# Patient Record
Sex: FEMALE | Race: BLACK OR AFRICAN AMERICAN | NOT HISPANIC OR LATINO | Employment: FULL TIME | URBAN - METROPOLITAN AREA
[De-identification: names, ages, dates, MRNs, and addresses within clinical notes are randomized per-mention and may not be internally consistent; named-entity substitution may affect disease eponyms.]

---

## 2017-02-13 ENCOUNTER — OFFICE VISIT (OUTPATIENT)
Dept: FAMILY MEDICINE | Facility: CLINIC | Age: 22
End: 2017-02-13

## 2017-02-13 VITALS
SYSTOLIC BLOOD PRESSURE: 102 MMHG | BODY MASS INDEX: 28.85 KG/M2 | OXYGEN SATURATION: 99 % | HEIGHT: 64 IN | DIASTOLIC BLOOD PRESSURE: 70 MMHG | HEART RATE: 66 BPM | WEIGHT: 169 LBS

## 2017-02-13 DIAGNOSIS — F90.2 ATTENTION DEFICIT HYPERACTIVITY DISORDER (ADHD), COMBINED TYPE: ICD-10-CM

## 2017-02-13 PROCEDURE — 99213 OFFICE O/P EST LOW 20 MIN: CPT | Performed by: FAMILY MEDICINE

## 2017-02-13 RX ORDER — CLONIDINE HYDROCHLORIDE 0.2 MG/1
0.2 TABLET ORAL AT BEDTIME
Qty: 30 TABLET | Refills: 1 | Status: ON HOLD | OUTPATIENT
Start: 2017-02-13 | End: 2019-04-13

## 2017-02-13 RX ORDER — LISDEXAMFETAMINE DIMESYLATE 20 MG/1
20 CAPSULE ORAL EVERY MORNING
Qty: 30 CAPSULE | Refills: 0 | Status: SHIPPED | OUTPATIENT
Start: 2017-02-13 | End: 2017-02-20

## 2017-02-13 NOTE — PROGRESS NOTES
"She has a history of ADHD, and is planning to go back to school, for CNA. She is hoping she can get prescription for Adderall or Vyvanse. She has a history of bipolar affective disorder, on no medications but doing well. She has taken clonidine at night to help her sleep when she was on stimulants before.  OBJECTIVE: /70  Pulse 66  Ht 1.632 m (5' 4.25\")  Wt 76.7 kg (169 lb)  SpO2 99%  BMI 28.78 kg/m2 NAD her mood and affect seem fine. 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  (F90.2) Attention deficit hyperactivity disorder (ADHD), combined type  Comment:   Plan: lisdexamfetamine (VYVANSE) 20 MG capsule,         cloNIDine (CATAPRES) 0.2 MG tablet        RTC 1 month for recheck.       "

## 2017-02-13 NOTE — MR AVS SNAPSHOT
"              After Visit Summary   2017    Milena Brumfield    MRN: [id number]           Patient Information     Date Of Birth          1995        Visit Information        Provider Department      2017 12:00 PM Lobo Smith MD [facility]        Today's Diagnoses     Attention deficit hyperactivity disorder (ADHD), combined type           Follow-ups after your visit        Follow-up notes from your care team     Return in about 4 weeks (around 3/13/2017).      Who to contact     If you have questions or need follow up information about today's clinic visit or your schedule please contact [facility] directly at [phone].  Normal or non-critical lab and imaging results will be communicated to you by MyChart, letter or phone within 4 business days after the clinic has received the results. If you do not hear from us within 7 days, please contact the clinic through Kamegoh[employer] or phone. If you have a critical or abnormal lab result, we will notify you by phone as soon as possible.  Submit refill requests through [employer] or call your pharmacy and they will forward the refill request to us. Please allow 3 business days for your refill to be completed.          Additional Information About Your Visit        MyChart Information     [employer] lets you send messages to your doctor, view your test results, renew your prescriptions, schedule appointments and more. To sign up, go to [email or web address] . Click on \"Log in\" on the left side of the screen, which will take you to the Welcome page. Then click on \"Sign up Now\" on the right side of the page.     You will be asked to enter the access code listed below, as well as some personal information. Please follow the directions to create your username and password.     Your access code is: [email or web address]  Expires: 2017 12:31 PM     Your access code will  in 90 days. If you need help or a new code, please call your [place] " "clinic or [phone].        Care EveryWhere ID     This is your Care EveryWhere ID. This could be used by other organizations to access your [place] medical records  [id number]        Your Vitals Were     Pulse Height Pulse Oximetry BMI (Body Mass Index)          66 1.632 m (5' 4.25\") 99% 28.78 kg/m2         Blood Pressure from Last 3 Encounters:   02/13/17 102/70   07/18/16 108/60   04/01/16 91/44    Weight from Last 3 Encounters:   02/13/17 76.7 kg (169 lb)   07/18/16 76.2 kg (168 lb)   04/01/16 72.6 kg (160 lb)              Today, you had the following     No orders found for display         Today's Medication Changes          These changes are accurate as of: 2/13/17 12:31 PM.  If you have any questions, ask your nurse or doctor.               Start taking these medicines.        Dose/Directions    cloNIDine 0.2 MG tablet   Commonly known as:  CATAPRES   Used for:  Attention deficit hyperactivity disorder (ADHD), combined type   Started by:  Lobo Smith MD        Dose:  0.2 mg   Take 1 tablet (0.2 mg) by mouth At Bedtime   Quantity:  30 tablet   Refills:  1       lisdexamfetamine 20 MG capsule   Commonly known as:  VYVANSE   Used for:  Attention deficit hyperactivity disorder (ADHD), combined type   Started by:  Lobo Smith MD        Dose:  20 mg   Take 1 capsule (20 mg) by mouth every morning   Quantity:  30 capsule   Refills:  0            Where to get your medicines      These medications were sent to [facility] [employer] [address] [facility] [address] AT [facility] [address] [facility] [address]     Phone:  [phone]     cloNIDine 0.2 MG tablet         Some of these will need a paper prescription and others can be bought over the counter.  Ask your nurse if you have questions.     Bring a paper prescription for each of these medications     lisdexamfetamine 20 MG capsule                Primary Care Provider Office Phone # Fax #    Lobo RICHEY " MD Luis [phone]       [facility] [address]  [facility] [address]        Thank you!     Thank you for choosing [facility]  for your care. Our goal is always to provide you with excellent care. Hearing back from our patients is one way we can continue to improve our services. Please take a few minutes to complete the written survey that you may receive in the mail after your visit with us. Thank you!             Your Updated Medication List - Protect others around you: Learn how to safely use, store and throw away your medicines at www.disposemymeds.org.          This list is accurate as of: 2/13/17 12:31 PM.  Always use your most recent med list.                   Brand Name Dispense Instructions for use    cloNIDine 0.2 MG tablet    CATAPRES    30 tablet    Take 1 tablet (0.2 mg) by mouth At Bedtime       lisdexamfetamine 20 MG capsule    VYVANSE    30 capsule    Take 1 capsule (20 mg) by mouth every morning

## 2017-02-20 ENCOUNTER — TELEPHONE (OUTPATIENT)
Dept: FAMILY MEDICINE | Facility: CLINIC | Age: 22
End: 2017-02-20

## 2017-02-20 DIAGNOSIS — F90.9 ADHD (ATTENTION DEFICIT HYPERACTIVITY DISORDER): Primary | ICD-10-CM

## 2017-02-20 RX ORDER — DEXTROAMPHETAMINE SACCHARATE, AMPHETAMINE ASPARTATE MONOHYDRATE, DEXTROAMPHETAMINE SULFATE AND AMPHETAMINE SULFATE 2.5; 2.5; 2.5; 2.5 MG/1; MG/1; MG/1; MG/1
10 CAPSULE, EXTENDED RELEASE ORAL DAILY
Qty: 30 CAPSULE | Refills: 0 | Status: ON HOLD | COMMUNITY
Start: 2017-02-20 | End: 2019-04-13

## 2017-02-20 NOTE — TELEPHONE ENCOUNTER
Received fax from [employer] that patients rx for Vyvanse needs PA or change to medication on formulary.  On formulary is generic Adderall XR,  Generic Metadate CD or generic Dexedrine spansule.  Per Dr Smith patient can try generic Adderall XR.  Rx wrote for patient.  Called patient and she will  hand written rx.

## 2017-06-24 ENCOUNTER — HEALTH MAINTENANCE LETTER (OUTPATIENT)
Age: 22
End: 2017-06-24

## 2018-03-01 ENCOUNTER — TRANSFERRED RECORDS (OUTPATIENT)
Dept: HEALTH INFORMATION MANAGEMENT | Facility: CLINIC | Age: 23
End: 2018-03-01

## 2018-03-01 LAB — PAP SMEAR - HIM PATIENT REPORTED: NEGATIVE

## 2018-09-18 ENCOUNTER — PRENATAL OFFICE VISIT (OUTPATIENT)
Dept: OBGYN | Facility: CLINIC | Age: 23
End: 2018-09-18
Payer: COMMERCIAL

## 2018-09-18 VITALS — SYSTOLIC BLOOD PRESSURE: 102 MMHG | BODY MASS INDEX: 33.55 KG/M2 | WEIGHT: 197 LBS | DIASTOLIC BLOOD PRESSURE: 62 MMHG

## 2018-09-18 DIAGNOSIS — Z34.80 SUPERVISION OF OTHER NORMAL PREGNANCY, ANTEPARTUM: ICD-10-CM

## 2018-09-18 DIAGNOSIS — Z23 NEED FOR TDAP VACCINATION: ICD-10-CM

## 2018-09-18 DIAGNOSIS — E04.9 THYROID ENLARGED: ICD-10-CM

## 2018-09-18 PROBLEM — F90.2 ATTENTION DEFICIT HYPERACTIVITY DISORDER (ADHD), COMBINED TYPE: Status: RESOLVED | Noted: 2017-02-13 | Resolved: 2018-09-18

## 2018-09-18 LAB
ABO + RH BLD: NORMAL
ABO + RH BLD: NORMAL
B-HCG SERPL-ACNC: 6416 IU/L (ref 0–5)
BLD GP AB SCN SERPL QL: NORMAL
BLOOD BANK CMNT PATIENT-IMP: NORMAL
ERYTHROCYTE [DISTWIDTH] IN BLOOD BY AUTOMATED COUNT: 12.3 % (ref 10–15)
HCT VFR BLD AUTO: 39.9 % (ref 35–47)
HGB BLD-MCNC: 13.8 G/DL (ref 11.7–15.7)
MCH RBC QN AUTO: 32.7 PG (ref 26.5–33)
MCHC RBC AUTO-ENTMCNC: 34.6 G/DL (ref 31.5–36.5)
MCV RBC AUTO: 95 FL (ref 78–100)
PLATELET # BLD AUTO: 192 10E9/L (ref 150–450)
RBC # BLD AUTO: 4.22 10E12/L (ref 3.8–5.2)
SPECIMEN EXP DATE BLD: NORMAL
TSH SERPL DL<=0.005 MIU/L-ACNC: 2.32 MU/L (ref 0.4–4)
WBC # BLD AUTO: 6 10E9/L (ref 4–11)

## 2018-09-18 PROCEDURE — 87340 HEPATITIS B SURFACE AG IA: CPT | Performed by: ADVANCED PRACTICE MIDWIFE

## 2018-09-18 PROCEDURE — 84702 CHORIONIC GONADOTROPIN TEST: CPT | Performed by: ADVANCED PRACTICE MIDWIFE

## 2018-09-18 PROCEDURE — 86762 RUBELLA ANTIBODY: CPT | Performed by: ADVANCED PRACTICE MIDWIFE

## 2018-09-18 PROCEDURE — 87086 URINE CULTURE/COLONY COUNT: CPT | Performed by: ADVANCED PRACTICE MIDWIFE

## 2018-09-18 PROCEDURE — 87491 CHLMYD TRACH DNA AMP PROBE: CPT | Performed by: ADVANCED PRACTICE MIDWIFE

## 2018-09-18 PROCEDURE — 86900 BLOOD TYPING SEROLOGIC ABO: CPT | Performed by: ADVANCED PRACTICE MIDWIFE

## 2018-09-18 PROCEDURE — 85027 COMPLETE CBC AUTOMATED: CPT | Performed by: ADVANCED PRACTICE MIDWIFE

## 2018-09-18 PROCEDURE — 86780 TREPONEMA PALLIDUM: CPT | Performed by: ADVANCED PRACTICE MIDWIFE

## 2018-09-18 PROCEDURE — 84443 ASSAY THYROID STIM HORMONE: CPT | Performed by: ADVANCED PRACTICE MIDWIFE

## 2018-09-18 PROCEDURE — 87389 HIV-1 AG W/HIV-1&-2 AB AG IA: CPT | Performed by: ADVANCED PRACTICE MIDWIFE

## 2018-09-18 PROCEDURE — 99207 ZZC FIRST OB VISIT: CPT | Performed by: ADVANCED PRACTICE MIDWIFE

## 2018-09-18 PROCEDURE — 86850 RBC ANTIBODY SCREEN: CPT | Performed by: ADVANCED PRACTICE MIDWIFE

## 2018-09-18 PROCEDURE — 36415 COLL VENOUS BLD VENIPUNCTURE: CPT | Performed by: ADVANCED PRACTICE MIDWIFE

## 2018-09-18 PROCEDURE — 87591 N.GONORRHOEAE DNA AMP PROB: CPT | Performed by: ADVANCED PRACTICE MIDWIFE

## 2018-09-18 PROCEDURE — 86901 BLOOD TYPING SEROLOGIC RH(D): CPT | Performed by: ADVANCED PRACTICE MIDWIFE

## 2018-09-18 NOTE — PROGRESS NOTES
NOB intake at [facility], returning CNM consumer.  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  Feels like she is in a great place.      Pt has unknown LMP and hx of irregular bleeding with Nexplanon, but a positive UPT, tender and sore breasts.  Had U/S here at [facility] on 18 and unable to see GS.  Repeat U/S scheduled for next week, will also add a beta hcg to NOB labs today.    Hx of enlarged thyroid, will draw TSH today, thyroid palpates WNL today.    RTC next wk for U/S and in 4 wks with CNM. JR Milena Brumfield is a 23 year old female, -American,     Pt presents to clinic today for a NOB visit.  No LMP recorded. Patient is pregnant.. Estimated Date of Delivery: Data Unavailable is calculated from + UPT, still in process of dating, see above    She has not had bleeding since her LMP.   She has had mild nausea. Weight loss has not occurred.   This was a planned pregnancy.   FOB is involved,       OTHER CONCERNS: none, pleased to be pregnant    Pt's hx of HSV is negative    ============================================  PERSONAL/SOCIAL HISTORY  Lives lives with their family.  Employment: Part time.  Her job involves light activity .  Exercises no regular exercise program  Pt denies abuse and feels safe in her home and relationships.     REVIEW OF SYSTEMS  C: NEGATIVE for fever, chills, change in weight  I: NEGATIVE for worrisome rashes, moles or lesions  E/M: NEGATIVE for ear, mouth and throat problems  R: NEGATIVE for significant cough or SOB  CV: NEGATIVE for chest pain, palpitations or peripheral edema  GI: NEGATIVE for nausea, abdominal pain, heartburn, or change in bowel habits  : NEGATIVE for frequency, dysuria, or hematuria  PSYCHIATRIC:  NEGATIVE for depression, anxiety or other mental health concerns    PHYSICAL EXAM:  /62  Wt 197 lb (89.4 kg)  BMI  33.55 kg/m2  BMI- Body mass index is 33.55 kg/(m^2)., RECOMMENDED WEIGHT GAIN: < 15 lbs.  GENERAL:  Pleasant pregnant female, alert, cooperative and well groomed.  SKIN:  Warm and dry, without lesions or rashes  HEAD: Symmetrical features.  MOUTH:  Buccal mucosa pink, moist without lesions.  Teeth in good repair.    NECK:  Thyroid without enlargement and nodules.  Lymph nodes not palpable.   LUNGS:  Clear to auscultation.      HEART:  RRR without murmur.  ABDOMEN: Soft without masses , tenderness or organomegaly.  No CVA tenderness.  Uterus not palpable at size equal to dates.  No scars noted..  MUSCULOSKELETAL:  Full range of motion  EXTREMITIES:  No edema. No significant varicosities.     PELVIC EXAM:        ASSESSMENT:  Intrauterine pregnancy at Unknown weeks gestation.     (Z23) Need for Tdap vaccination  Comment:   Plan:     (Z34.80) Supervision of other normal pregnancy, antepartum  Comment:   Plan:     (E04.9) Thyroid enlarged  Comment:   Plan:       PLAN:  Oriented to CNM service.    Instructed on use of triage nurse line and contacting the on call CNM after hours for an urgent need such as fever, vagina bleeding, bladder or vaginal infection, rupture of membranes,  or term labor.      Discussed the indications, uses for and false positives for quad screen  and fetal survey ultrasound at 18-20 weeks gestation. Will inform us at the next visit if she wished to avail herself of these screens.    Instructed on best evidence for: weight gain for her weight and height for pregnancy; healthy diet; exercise and activity during pregnancy; and maintenance of a generally healthy lifestyle.     Discussed the harms, benefits, side effects and alternative therapies for current prescribed and OTC medications.    Niurka Moss CNM    Pt reports pap smear 3/2018 when she had her Nexplanon removed in [facility], reports NIL, please abstract in.    Niurka Moss CNM      Beta hcg came back at 6416, call to pt to  notify her that I will order an U/S at [place] for this week.    Niurka Moss CNM

## 2018-09-18 NOTE — MR AVS SNAPSHOT
"              After Visit Summary   2018    Milena Brumfield    MRN: [id number]           Patient Information     Date Of Birth          1995        Visit Information        Provider Department      2018 9:00 AM Niurka Moss APRN CNM [facility]        Today's Diagnoses     Need for Tdap vaccination        Supervision of other normal pregnancy, antepartum        Thyroid enlarged           Follow-ups after your visit        Who to contact     If you have questions or need follow up information about today's clinic visit or your schedule please contact [facility] directly at [phone].  Normal or non-critical lab and imaging results will be communicated to you by [employer], letter or phone within 4 business days after the clinic has received the results. If you do not hear from us within 7 days, please contact the clinic through [employer] or phone. If you have a critical or abnormal lab result, we will notify you by phone as soon as possible.  Submit refill requests through [employer] or call your pharmacy and they will forward the refill request to us. Please allow 3 business days for your refill to be completed.          Additional Information About Your Visit        MyChart Information     [employer] lets you send messages to your doctor, view your test results, renew your prescriptions, schedule appointments and more. To sign up, go to [email or web address] . Click on \"Log in\" on the left side of the screen, which will take you to the Welcome page. Then click on \"Sign up Now\" on the right side of the page.     You will be asked to enter the access code listed below, as well as some personal information. Please follow the directions to create your username and password.     Your access code is: [email or web address]  Expires: 2018 10:29 AM     Your access code will  in 90 days. If you need help or a new code, please call your [facility] or [phone].      "   Care EveryWhere ID     This is your Care EveryWhere ID. This could be used by other organizations to access your [place] medical records  [id number]        Your Vitals Were     BMI (Body Mass Index)                   33.55 kg/m2            Blood Pressure from Last 3 Encounters:   09/18/18 102/62   02/13/17 102/70   07/18/16 108/60    Weight from Last 3 Encounters:   09/18/18 197 lb (89.4 kg)   02/13/17 169 lb (76.7 kg)   07/18/16 168 lb (76.2 kg)              We Performed the Following     ABO/Rh type and screen     CBC with platelets     Chlamydia trachomatis PCR     HCG Quantitative Pregnancy, Blood ([id number])     Hepatitis B surface antigen     HIV Antigen Antibody Combo     Neisseria gonorrhoeae PCR     Rubella Antibody IgG Quantitative     TSH with free T4 reflex     Urine Culture Aerobic Bacterial        Primary Care Provider Office Phone # Fax #    Lobo Smith [phone] [address] [facility] [address]        Equal Access to Services     [facility] : Hadii aad ku hadasho Soomaali, waaxda luqadaha, qaybta kaalmada adeegyada, waxay idiin hayaan adeeg kharash la'tristan . So [facility] [phone].    ATENCIÓN: Si habla español, tiene a sands disposición servicios gratuitos de asistencia lingüística. [facility] [phone].    We comply with applicable federal civil rights laws and [place] laws. We do not discriminate on the basis of race, color, national origin, age, disability, sex, sexual orientation, or gender identity.            Thank you!     Thank you for choosing [facility]  for your care. Our goal is always to provide you with excellent care. Hearing back from our patients is one way we can continue to improve our services. Please take a few minutes to complete the written survey that you may receive in the mail after your visit with us. Thank you!             Your Updated Medication List - Protect others around you: Learn how to safely use, store and  throw away your medicines at www.disposemymeds.org.          This list is accurate as of 9/18/18 10:29 AM.  Always use your most recent med list.                   Brand Name Dispense Instructions for use Diagnosis    ADDERALL XR 10 MG per 24 hr capsule   Generic drug:  amphetamine-dextroamphetamine     30 capsule    Take 1 capsule (10 mg) by mouth daily    ADHD (attention deficit hyperactivity disorder)       cloNIDine 0.2 MG tablet    CATAPRES    30 tablet    Take 1 tablet (0.2 mg) by mouth At Bedtime    Attention deficit hyperactivity disorder (ADHD), combined type

## 2018-09-19 LAB
BACTERIA SPEC CULT: NORMAL
C TRACH DNA SPEC QL NAA+PROBE: NEGATIVE
HBV SURFACE AG SERPL QL IA: NONREACTIVE
HIV 1+2 AB+HIV1 P24 AG SERPL QL IA: NONREACTIVE
Lab: NORMAL
N GONORRHOEA DNA SPEC QL NAA+PROBE: NEGATIVE
RUBV IGG SERPL IA-ACNC: 13 IU/ML
SPECIMEN SOURCE: NORMAL
T PALLIDUM AB SER QL: NONREACTIVE

## 2018-10-16 ENCOUNTER — PRENATAL OFFICE VISIT (OUTPATIENT)
Dept: OBGYN | Facility: CLINIC | Age: 23
End: 2018-10-16
Payer: COMMERCIAL

## 2018-10-16 DIAGNOSIS — Z53.9 NO SHOW: Primary | ICD-10-CM

## 2018-10-16 NOTE — MR AVS SNAPSHOT
"              After Visit Summary   10/16/2018    Milena Brumfield    MRN: [id number]           Patient Information     Date Of Birth          1995        Visit Information        Provider Department      10/16/2018 9:45 AM Anna Dumont CNM [facility]        Today's Diagnoses     NO SHOW    -  1       Follow-ups after your visit        Who to contact     If you have questions or need follow up information about today's clinic visit or your schedule please contact [facility] directly at [phone].  Normal or non-critical lab and imaging results will be communicated to you by [employer]hart, letter or phone within 4 business days after the clinic has received the results. If you do not hear from us within 7 days, please contact the clinic through [employer] or phone. If you have a critical or abnormal lab result, we will notify you by phone as soon as possible.  Submit refill requests through [employer] or call your pharmacy and they will forward the refill request to us. Please allow 3 business days for your refill to be completed.          Additional Information About Your Visit        MyChart Information     [employer] lets you send messages to your doctor, view your test results, renew your prescriptions, schedule appointments and more. To sign up, go to [email or web address] . Click on \"Log in\" on the left side of the screen, which will take you to the Welcome page. Then click on \"Sign up Now\" on the right side of the page.     You will be asked to enter the access code listed below, as well as some personal information. Please follow the directions to create your username and password.     Your access code is: [email or web address]  Expires: 2018 10:29 AM     Your access code will  in 90 days. If you need help or a new code, please call your [facility] or [phone].        Care EveryWhere ID     This is your Care EveryWhere ID. This could be used by other organizations " to access your [place] medical records  [id number]         Blood Pressure from Last 3 Encounters:   09/18/18 102/62   02/13/17 102/70   07/18/16 108/60    Weight from Last 3 Encounters:   09/18/18 197 lb (89.4 kg)   02/13/17 169 lb (76.7 kg)   07/18/16 168 lb (76.2 kg)              Today, you had the following     No orders found for display       Primary Care Provider Office Phone # Fax #    Lobo Smith [phone] [address]  [facility] [address]        Equal Access to Services     [facility]: Hadii aad ku hadasho Soomaali, waaxda luqadaha, qaybta kaalmada adeegyada, waxdede rain hayelviran alfredo concepcion . So [facility] [place] [phone].    ATENCIÓN: Si habla español, tiene a sands disposición servicios gratuitos de asistencia lingüística. [facility] [phone].    We comply with applicable federal civil rights laws and [place] laws. We do not discriminate on the basis of race, color, national origin, age, disability, sex, sexual orientation, or gender identity.            Thank you!     Thank you for choosing [facility]  for your care. Our goal is always to provide you with excellent care. Hearing back from our patients is one way we can continue to improve our services. Please take a few minutes to complete the written survey that you may receive in the mail after your visit with us. Thank you!             Your Updated Medication List - Protect others around you: Learn how to safely use, store and throw away your medicines at www.disposemymeds.org.          This list is accurate as of 10/16/18  2:19 PM.  Always use your most recent med list.                   Brand Name Dispense Instructions for use Diagnosis    ADDERALL XR 10 MG per 24 hr capsule   Generic drug:  amphetamine-dextroamphetamine     30 capsule    Take 1 capsule (10 mg) by mouth daily    ADHD (attention deficit hyperactivity disorder)       cloNIDine 0.2 MG tablet    CATAPRES    30 tablet    Take 1  tablet (0.2 mg) by mouth At Bedtime    Attention deficit hyperactivity disorder (ADHD), combined type

## 2018-10-30 ENCOUNTER — PRENATAL OFFICE VISIT (OUTPATIENT)
Dept: OBGYN | Facility: CLINIC | Age: 23
End: 2018-10-30
Payer: COMMERCIAL

## 2018-10-30 VITALS — SYSTOLIC BLOOD PRESSURE: 110 MMHG | BODY MASS INDEX: 32.96 KG/M2 | WEIGHT: 193.5 LBS | DIASTOLIC BLOOD PRESSURE: 80 MMHG

## 2018-10-30 DIAGNOSIS — Z34.80 SUPERVISION OF OTHER NORMAL PREGNANCY, ANTEPARTUM: Primary | ICD-10-CM

## 2018-10-30 PROCEDURE — 99207 ZZC PRENATAL VISIT: CPT | Performed by: ADVANCED PRACTICE MIDWIFE

## 2018-10-30 RX ORDER — PRENATAL VIT/IRON FUM/FOLIC AC 27MG-0.8MG
1 TABLET ORAL DAILY
Qty: 100 TABLET | Refills: 3 | Status: SHIPPED | OUTPATIENT
Start: 2018-10-30

## 2018-10-30 NOTE — MR AVS SNAPSHOT
"              After Visit Summary   10/30/2018    Milena Brumfield    MRN: [id number]           Patient Information     Date Of Birth          1995        Visit Information        Provider Department      10/30/2018 8:30 AM Anna Dumont CNM [facility]        Today's Diagnoses     Supervision of other normal pregnancy, antepartum    -  1       Follow-ups after your visit        Follow-up notes from your care team     Return in about 4 weeks (around 2018) for Prenatal care with HELENE.      Who to contact     If you have questions or need follow up information about today's clinic visit or your schedule please contact [facility] directly at [phone].  Normal or non-critical lab and imaging results will be communicated to you by MyChart, letter or phone within 4 business days after the clinic has received the results. If you do not hear from us within 7 days, please contact the clinic through [facility] or phone. If you have a critical or abnormal lab result, we will notify you by phone as soon as possible.  Submit refill requests through [facility] [employer] [facility] or call your pharmacy and they will forward the refill request to us. Please allow 3 business days for your refill to be completed.          Additional Information About Your Visit        MyChart Information     [facility] lets you send messages to your doctor, view your test results, renew your prescriptions, schedule appointments and more. To sign up, go to [email or web address] . Click on \"Log in\" on the left side of the screen, which will take you to the Welcome page. Then click on \"Sign up Now\" on the right side of the page.     You will be asked to enter the access code listed below, as well as some personal information. Please follow the directions to create your username and password.     Your access code is: [email or web address]  Expires: 2018 10:29 AM     Your access code will  in 90 days. If you need help or a new " code, please call your [place] clinic or [phone].        Care EveryWhere ID     This is your Care EveryWhere ID. This could be used by other organizations to access your [place] medical records  [id number]        Your Vitals Were     Last Period BMI (Body Mass Index)                (LMP Unknown) 32.96 kg/m2           Blood Pressure from Last 3 Encounters:   10/30/18 110/80   09/18/18 102/62   02/13/17 102/70    Weight from Last 3 Encounters:   10/30/18 193 lb 8 oz (87.8 kg)   09/18/18 197 lb (89.4 kg)   02/13/17 169 lb (76.7 kg)              Today, you had the following     No orders found for display         Today's Medication Changes          These changes are accurate as of 10/30/18  8:40 AM.  If you have any questions, ask your nurse or doctor.               Start taking these medicines.        Dose/Directions    prenatal multivitamin plus iron 27-0.8 MG Tabs per tablet   Used for:  Supervision of other normal pregnancy, antepartum   Started by:  Anna Dumont CNM        Dose:  1 tablet   Take 1 tablet by mouth daily   Quantity:  100 tablet   Refills:  3            Where to get your medicines      Some of these will need a paper prescription and others can be bought over the counter.  Ask your nurse if you have questions.     Bring a paper prescription for each of these medications     prenatal multivitamin plus iron 27-0.8 MG Tabs per tablet                Primary Care Provider Office Phone # Fax #    Lobo Smith [phone] [address] [facility] [address]        Equal Access to Services     [facility]: Hadii daphney ku hadasho Soramírezali, waaxda luqadaha, qaybta kaalmada adeegyada, brett fontaine. So [facility] [phone].    ATENCIÓN: Si habla español, tiene a sands disposición servicios gratuitos de asistencia lingüística. Llame al [phone].    We comply with applicable federal civil rights laws and [place] laws. We do not  discriminate on the basis of race, color, national origin, age, disability, sex, sexual orientation, or gender identity.            Thank you!     Thank you for choosing [facility]  for your care. Our goal is always to provide you with excellent care. Hearing back from our patients is one way we can continue to improve our services. Please take a few minutes to complete the written survey that you may receive in the mail after your visit with us. Thank you!             Your Updated Medication List - Protect others around you: Learn how to safely use, store and throw away your medicines at www.disposemymeds.org.          This list is accurate as of 10/30/18  8:40 AM.  Always use your most recent med list.                   Brand Name Dispense Instructions for use Diagnosis    ADDERALL XR 10 MG per 24 hr capsule   Generic drug:  amphetamine-dextroamphetamine     30 capsule    Take 1 capsule (10 mg) by mouth daily    ADHD (attention deficit hyperactivity disorder)       cloNIDine 0.2 MG tablet    CATAPRES    30 tablet    Take 1 tablet (0.2 mg) by mouth At Bedtime    Attention deficit hyperactivity disorder (ADHD), combined type       prenatal multivitamin plus iron 27-0.8 MG Tabs per tablet     100 tablet    Take 1 tablet by mouth daily    Supervision of other normal pregnancy, antepartum

## 2018-10-30 NOTE — PROGRESS NOTES
Feeling well.  Denies any leaking of fluid, vaginal bleeding, regular uterine contractions, or headaches or other concerns.  Declines genetic screening.  Due date chosen from 2nd US.  LMP unknown.    Reviewed to call [phone] for contractions, loss of fluid, vaginal bleeding, decreased fetal movement or any other questions or concerns.    RTC in 4 weeks.  Anna Dumont, LUPE, APRN, CNM

## 2018-12-18 ENCOUNTER — PRENATAL OFFICE VISIT (OUTPATIENT)
Dept: OBGYN | Facility: CLINIC | Age: 23
End: 2018-12-18
Payer: COMMERCIAL

## 2018-12-18 VITALS — SYSTOLIC BLOOD PRESSURE: 92 MMHG | BODY MASS INDEX: 33.21 KG/M2 | DIASTOLIC BLOOD PRESSURE: 60 MMHG | WEIGHT: 195 LBS

## 2018-12-18 DIAGNOSIS — Z34.80 SUPERVISION OF OTHER NORMAL PREGNANCY, ANTEPARTUM: Primary | ICD-10-CM

## 2018-12-18 PROCEDURE — 99207 ZZC PRENATAL VISIT: CPT | Performed by: ADVANCED PRACTICE MIDWIFE

## 2018-12-18 NOTE — PROGRESS NOTES
Nausea improved with wt gain up.  No issues with depression.  Usually does well in pregnancy but will keep it assessed.  Just the start of movement of fetus.  Curly has a 8 yo son and she had a 6 yr daughter so US for gender and anatomy at next visit.  ASSESSMENT: 18w0d   PLAN: RTC in 6 wks at [facility] for GCT.  Fetal survey at PP.   JE

## 2018-12-31 ENCOUNTER — ANCILLARY PROCEDURE (OUTPATIENT)
Dept: ULTRASOUND IMAGING | Facility: CLINIC | Age: 23
End: 2018-12-31
Attending: ADVANCED PRACTICE MIDWIFE
Payer: COMMERCIAL

## 2018-12-31 ENCOUNTER — PRENATAL OFFICE VISIT (OUTPATIENT)
Dept: MIDWIFE SERVICES | Facility: CLINIC | Age: 23
End: 2018-12-31
Attending: ADVANCED PRACTICE MIDWIFE
Payer: COMMERCIAL

## 2018-12-31 VITALS
OXYGEN SATURATION: 95 % | BODY MASS INDEX: 34.88 KG/M2 | SYSTOLIC BLOOD PRESSURE: 116 MMHG | HEIGHT: 64 IN | DIASTOLIC BLOOD PRESSURE: 76 MMHG | WEIGHT: 204.3 LBS | HEART RATE: 105 BPM

## 2018-12-31 DIAGNOSIS — Z34.80 SUPERVISION OF OTHER NORMAL PREGNANCY, ANTEPARTUM: ICD-10-CM

## 2018-12-31 DIAGNOSIS — F17.200 SMOKER: ICD-10-CM

## 2018-12-31 DIAGNOSIS — Z34.80 SUPERVISION OF OTHER NORMAL PREGNANCY, ANTEPARTUM: Primary | ICD-10-CM

## 2018-12-31 PROCEDURE — 99207 ZZC PRENATAL VISIT: CPT | Performed by: ADVANCED PRACTICE MIDWIFE

## 2018-12-31 PROCEDURE — 76805 OB US >/= 14 WKS SNGL FETUS: CPT | Performed by: OBSTETRICS & GYNECOLOGY

## 2018-12-31 ASSESSMENT — MIFFLIN-ST. JEOR: SCORE: 1665.67

## 2018-12-31 NOTE — PROGRESS NOTES
Had US today and boy.  Has girl at home.  Happy and all Nl anatomy.  S=D.  Movement present.  Quit smoking with 1st pregnancy but restarted so quitting now.  Down to 1-2 a day.  Discussed rebound to smoking post partum.  ASSESSMENT: 19w6d  PLAN: RTC in 4 wks for GCT at [facility].  LAVINIA

## 2019-01-29 ENCOUNTER — PRENATAL OFFICE VISIT (OUTPATIENT)
Dept: OBGYN | Facility: CLINIC | Age: 24
End: 2019-01-29

## 2019-01-29 VITALS — DIASTOLIC BLOOD PRESSURE: 68 MMHG | WEIGHT: 199 LBS | BODY MASS INDEX: 33.89 KG/M2 | SYSTOLIC BLOOD PRESSURE: 108 MMHG

## 2019-01-29 DIAGNOSIS — Z34.82 ENCOUNTER FOR SUPERVISION OF OTHER NORMAL PREGNANCY IN SECOND TRIMESTER: Primary | ICD-10-CM

## 2019-01-29 LAB
GLUCOSE 1H P 50 G GLC PO SERPL-MCNC: 84 MG/DL (ref 60–129)
HEMOGLOBIN: 11 G/DL (ref 11.7–15.7)

## 2019-01-29 PROCEDURE — 36415 COLL VENOUS BLD VENIPUNCTURE: CPT | Performed by: ADVANCED PRACTICE MIDWIFE

## 2019-01-29 PROCEDURE — 00000218 HCL OB HEMOGLOBIN (NS/SS): Performed by: ADVANCED PRACTICE MIDWIFE

## 2019-01-29 PROCEDURE — 99207 ZZC PRENATAL VISIT: CPT | Performed by: ADVANCED PRACTICE MIDWIFE

## 2019-01-29 PROCEDURE — 82950 GLUCOSE TEST: CPT | Performed by: ADVANCED PRACTICE MIDWIFE

## 2019-01-29 NOTE — PROGRESS NOTES
24w0d   Patient feeling well. Positive fetal movement. Denies water leaking, vaginal bleeding, decreased fetal movement, contraction pain, or other concerns.  Doing well. No concerns today. Smoking about 2 cigs per day but feeling sick when smoking so feels like she will no longer crave them soon. Hoping to completely quit.   Measuring big and anatomy ultrasound at 20 weeks 1w3d greater than dating. Dated off 6 week ultrasound at [facility]. Patient has unknown LMP. Will continue to monitor growth.   Danger signs discussed.    Knows when to call triage and has phone numbers to call.   Follow up in 4 weeks. Tdap vaccination at next visit.   Zhane Ceja CNM

## 2019-01-31 ENCOUNTER — ANCILLARY PROCEDURE (OUTPATIENT)
Dept: ULTRASOUND IMAGING | Facility: CLINIC | Age: 24
End: 2019-01-31

## 2019-01-31 DIAGNOSIS — Z34.80 SUPERVISION OF OTHER NORMAL PREGNANCY, ANTEPARTUM: ICD-10-CM

## 2019-01-31 PROCEDURE — 76816 OB US FOLLOW-UP PER FETUS: CPT | Performed by: OBSTETRICS & GYNECOLOGY

## 2019-02-01 PROBLEM — Z34.80 SUPERVISION OF OTHER NORMAL PREGNANCY, ANTEPARTUM: Status: ACTIVE | Noted: 2018-09-18

## 2019-02-19 ENCOUNTER — PRENATAL OFFICE VISIT (OUTPATIENT)
Dept: MIDWIFE SERVICES | Facility: CLINIC | Age: 24
End: 2019-02-19

## 2019-02-19 ENCOUNTER — TELEPHONE (OUTPATIENT)
Dept: MIDWIFE SERVICES | Facility: CLINIC | Age: 24
End: 2019-02-19

## 2019-02-19 VITALS
WEIGHT: 206 LBS | DIASTOLIC BLOOD PRESSURE: 74 MMHG | HEIGHT: 64 IN | BODY MASS INDEX: 35.17 KG/M2 | OXYGEN SATURATION: 92 % | HEART RATE: 107 BPM | SYSTOLIC BLOOD PRESSURE: 121 MMHG

## 2019-02-19 DIAGNOSIS — N76.0 VAGINITIS AND VULVOVAGINITIS: ICD-10-CM

## 2019-02-19 DIAGNOSIS — R30.0 DYSURIA: ICD-10-CM

## 2019-02-19 DIAGNOSIS — Z3A.27 27 WEEKS GESTATION OF PREGNANCY: ICD-10-CM

## 2019-02-19 DIAGNOSIS — O36.8120 DECREASED FETAL MOVEMENTS IN SECOND TRIMESTER, SINGLE OR UNSPECIFIED FETUS: Primary | ICD-10-CM

## 2019-02-19 DIAGNOSIS — N94.9 VAGINAL SYMPTOM: ICD-10-CM

## 2019-02-19 LAB
ALBUMIN UR-MCNC: NEGATIVE MG/DL
AMORPH CRY #/AREA URNS HPF: ABNORMAL /HPF
APPEARANCE UR: ABNORMAL
BACTERIA #/AREA URNS HPF: ABNORMAL /HPF
BILIRUB UR QL STRIP: NEGATIVE
COLOR UR AUTO: YELLOW
GLUCOSE UR STRIP-MCNC: NEGATIVE MG/DL
HGB UR QL STRIP: NEGATIVE
KETONES UR STRIP-MCNC: NEGATIVE MG/DL
LEUKOCYTE ESTERASE UR QL STRIP: NEGATIVE
NITRATE UR QL: NEGATIVE
NON-SQ EPI CELLS #/AREA URNS LPF: ABNORMAL /LPF
PH UR STRIP: 8 PH (ref 5–7)
RBC #/AREA URNS AUTO: ABNORMAL /HPF
SOURCE: ABNORMAL
SP GR UR STRIP: 1.01 (ref 1–1.03)
SPECIMEN SOURCE: NORMAL
UROBILINOGEN UR STRIP-ACNC: 1 EU/DL (ref 0.2–1)
WBC #/AREA URNS AUTO: ABNORMAL /HPF
WET PREP SPEC: NORMAL

## 2019-02-19 PROCEDURE — 87591 N.GONORRHOEAE DNA AMP PROB: CPT | Performed by: ADVANCED PRACTICE MIDWIFE

## 2019-02-19 PROCEDURE — 81001 URINALYSIS AUTO W/SCOPE: CPT | Performed by: ADVANCED PRACTICE MIDWIFE

## 2019-02-19 PROCEDURE — 87491 CHLMYD TRACH DNA AMP PROBE: CPT | Performed by: ADVANCED PRACTICE MIDWIFE

## 2019-02-19 PROCEDURE — 87210 SMEAR WET MOUNT SALINE/INK: CPT | Performed by: ADVANCED PRACTICE MIDWIFE

## 2019-02-19 PROCEDURE — 99213 OFFICE O/P EST LOW 20 MIN: CPT | Mod: 25 | Performed by: ADVANCED PRACTICE MIDWIFE

## 2019-02-19 PROCEDURE — 59025 FETAL NON-STRESS TEST: CPT | Performed by: ADVANCED PRACTICE MIDWIFE

## 2019-02-19 ASSESSMENT — MIFFLIN-ST. JEOR: SCORE: 1673.38

## 2019-02-19 NOTE — TELEPHONE ENCOUNTER
Per José Miguel, patient to come to clinic for evaluation. Patient aware and appt made.  Rebecca Bello

## 2019-02-19 NOTE — TELEPHONE ENCOUNTER
"Patient calling regarding decreased fetal movement since yesterday - is feeling movement, but less and not as strong as normal. Has also noticed increase in pressure in her vagina - \"feeling like I need poop\". Is having a white/mucusy discharge as well. No contractions. No vaginal bleeding. Does feel like she is having regular BMs. Does feel like she is having some increase in urgency and frequency with urination. Informed patient that I will send a message to on call midwife to see if they want to see her in clinic or go to L&D, but that she most likely will need to be seen and evaluated. Please advise.  Rebecca Bello    "

## 2019-02-19 NOTE — PROGRESS NOTES
"S: Milena is here after speaking with the triage RN today, reporting that she has not been feeling her baby move as much as usual for the past day. She has noticed increased pressure in her vagina, as well as some tightening of her belly, maybe \"a couple times\" in an hour. She has had increased lower back and leg discomfort for the past few weeks. For the last couple days, she has had an increase in her vaginal discharge which is clear, denies odor or itching. She feels that she has needed to urinate more often, but denies burning; describes feeling like she needs to go really bad, but unable to pass much urine. She denies any falls, changes in stress, or intercourse for the past few days.     Review Of Systems  Gastrointestinal: negative  Genitourinary: positive for frequency and hesitancy; negative for dysuria  Musculoskeletal: positive for lower back and bilateral leg soreness    O: /74   Pulse 107   Ht 1.632 m (5' 4.25\")   Wt 93.4 kg (206 lb)   LMP  (LMP Unknown)   SpO2 92%   BMI 35.09 kg/m    Exam:  Constitutional: healthy, alert and no distress  Pelvic Exam:  Vulva: No external lesions, normal hair distribution, no adenopathy  Vagina: Moist, pink, no abnormal discharge, well rugated, no lesions  Cervix: smooth, pink, no visible lesions; thick, yellow discharge from cervix; SVE closed/25/high/soft/middle  NST: REACTIVE; baseline 145 with moderate variability, +accelerations, no decelerations  No contractions noted in 20 min of fetal/uterine monitoring.  Wet prep: negative  Gonorrhea/Chlamydia: pending  Recent Labs   Lab Test 02/19/19  1609   COLOR Yellow   APPEARANCE Slightly Cloudy   URINEGLC Negative   URINEBILI Negative   URINEKETONE Negative   SG 1.015   UBLD Negative   URINEPH 8.0*   PROTEIN Negative   UROBILINOGEN 1.0   NITRITE Negative   LEUKEST Negative   RBCU O - 2   WBCU 0 - 5       A/P:  (O36.8120) Decreased fetal movements in second trimester, single or unspecified fetus  (primary " encounter diagnosis)  Plan: FETAL NON-STRESS TEST    (N94.9) Vaginal symptom  Plan: Wet prep, Urine Microscopic    (R30.0) Dysuria  Plan: UA reflex to Microscopic and Culture    (N76.0) Vaginitis and vulvovaginitis  Plan: Wet prep, Neisseria gonorrhoeae PCR, Chlamydia         trachomatis PCR    (Z3A.27) 27 weeks gestation of pregnancy    Discussed PTL symptoms and when to report. Suggested patient go home and hydrate, take a warm bath, and rest. Call CNM team if increase in vaginal pain or pressure, vaginal bleeding, 5 or more contractions in an hour, or leaking of fluid. Patient verbalized understanding of what symptoms to report. Knows to call [phone] with concerns.    Return to care for regularly scheduled appointment, or sooner with concerns.    Rony Lemos CNM

## 2019-02-21 LAB
C TRACH DNA SPEC QL NAA+PROBE: NEGATIVE
N GONORRHOEA DNA SPEC QL NAA+PROBE: NEGATIVE
SPECIMEN SOURCE: NORMAL
SPECIMEN SOURCE: NORMAL

## 2019-03-05 ENCOUNTER — PRENATAL OFFICE VISIT (OUTPATIENT)
Dept: OBGYN | Facility: CLINIC | Age: 24
End: 2019-03-05

## 2019-03-05 VITALS — BODY MASS INDEX: 34.74 KG/M2 | SYSTOLIC BLOOD PRESSURE: 108 MMHG | WEIGHT: 204 LBS | DIASTOLIC BLOOD PRESSURE: 60 MMHG

## 2019-03-05 DIAGNOSIS — Z34.83 ENCOUNTER FOR SUPERVISION OF OTHER NORMAL PREGNANCY IN THIRD TRIMESTER: Primary | ICD-10-CM

## 2019-03-05 PROCEDURE — 99207 ZZC PRENATAL VISIT: CPT | Performed by: ADVANCED PRACTICE MIDWIFE

## 2019-03-05 NOTE — PROGRESS NOTES
29w0d  Patient feeling well. Positive fetal movement. Denies water leaking, vaginal bleeding, decreased fetal movement, contraction pain, or other concerns.  Doing well. Feeling some pressure and bert marx. Describes it as feeling like baby is coming but does not actually think or feel like baby is coming just the same pressure type feeling. Discussed labor some. Thinking about trying to not use the epidural. Used it last pregnancy and did not like how it make her so numb she did not feel anything. We discussed other pain medication options and also lowering the dose of her epidural if she feels like she liked it but does not want it so heavy. She is going to try without anything and make decisions as she goes.   She feels like she is doing really well mood wise. She has no ups and downs like she did last pregnancy. Her and her daughter are doing well and having fun preparing for the baby.   Tdap vaccination today.   Danger signs discussed.   Knows when to call triage and has phone numbers to call.   Follow up in 2 weeks.   Zhane Ceja CNM

## 2019-03-19 ENCOUNTER — APPOINTMENT (OUTPATIENT)
Dept: OBGYN | Facility: CLINIC | Age: 24
End: 2019-03-19
Payer: COMMERCIAL

## 2019-03-26 ENCOUNTER — PRENATAL OFFICE VISIT (OUTPATIENT)
Dept: MIDWIFE SERVICES | Facility: CLINIC | Age: 24
End: 2019-03-26
Payer: COMMERCIAL

## 2019-03-26 VITALS
DIASTOLIC BLOOD PRESSURE: 73 MMHG | SYSTOLIC BLOOD PRESSURE: 109 MMHG | BODY MASS INDEX: 34.4 KG/M2 | WEIGHT: 202 LBS | HEART RATE: 97 BPM

## 2019-03-26 DIAGNOSIS — Z34.83 ENCOUNTER FOR SUPERVISION OF OTHER NORMAL PREGNANCY IN THIRD TRIMESTER: Primary | ICD-10-CM

## 2019-03-26 PROCEDURE — 99207 ZZC PRENATAL VISIT: CPT | Performed by: ADVANCED PRACTICE MIDWIFE

## 2019-03-26 NOTE — PROGRESS NOTES
32w0d  Milena is here with Curly today. Feeling some lower back pain and pressure in her pelvis. Denies contractions, bleeding, leaking of fluid; baby is active. States that she feels her mood is stable. Again discussed pain medications during labor, she is hoping to avoid epidural. She was GBS+ in last pregnancy, wondering if she will need abx this time. Discussed testing at 36w, and that bacteria can come and go; may not need it this pregnancy. return to care 2 weeks.  Rony Lemos CNM

## 2019-04-02 ENCOUNTER — PRENATAL OFFICE VISIT (OUTPATIENT)
Dept: OBGYN | Facility: CLINIC | Age: 24
End: 2019-04-02
Payer: COMMERCIAL

## 2019-04-02 DIAGNOSIS — Z53.9 NO SHOW: Primary | ICD-10-CM

## 2019-04-11 ENCOUNTER — TELEPHONE (OUTPATIENT)
Dept: MIDWIFE SERVICES | Facility: CLINIC | Age: 24
End: 2019-04-11

## 2019-04-11 NOTE — TELEPHONE ENCOUNTER
Pt is currently in [place].  She started having cramping, back pain, mucousy discharge, and diarrhea last night.  Given the weather, advised that she be seen at hospital in [place] for eval rather than getting stuck on a road to [place].  Pt verbalized understanding.  Margaret Mcghee RN

## 2019-04-12 ENCOUNTER — TELEPHONE (OUTPATIENT)
Dept: MIDWIFE SERVICES | Facility: CLINIC | Age: 24
End: 2019-04-12

## 2019-04-12 NOTE — TELEPHONE ENCOUNTER
Pt is 34w2d, in [place] at the hospital. Having a lot pelvic pressure, back pain, diarrhea last night and this morning. Dilated 3cm. Having a couple of contractions. Had amnisure done - waiting on results. Has been wetter down there per pt but doesn't think she has been LOF. Thinks that she lost her mucus plug the other day. +FM. Pt is wondering if she should come down to [place].  Routing to on-call CNM. Please call patient to discuss [phone].  Shilpi Sherman, RN-BSN

## 2019-04-12 NOTE — TELEPHONE ENCOUNTER
Returned patient's phone call. She reports that she was evaluated in [facility] for increased pelvic pressure. Says that her cervix was 3cm on both the initial cervical exam and the repeat after a couple hours. BV was diagnosed and treatment prescribed. Baby is active. She is not having contractions currently, no LOF or bleeding. Wondering if she should come here for evaluation. She is currently in [facility] and I explained that since she is not actively having contractions or signs of  labor I don't think she needs to drive here for evaluation. We discussed that BV can cause some of these symptoms so I encouraged her to start treatment, rest, hydrate, comfort measures and if symptoms worsening she should either return to [facility] or come to [facility] for evaluation. Pt verbalizes understanding and agrees to plan. Edda Medina CNM

## 2019-04-13 ENCOUNTER — NURSE TRIAGE (OUTPATIENT)
Dept: NURSING | Facility: CLINIC | Age: 24
End: 2019-04-13

## 2019-04-13 ENCOUNTER — HOSPITAL ENCOUNTER (OUTPATIENT)
Facility: CLINIC | Age: 24
Discharge: HOME | End: 2019-04-13
Attending: ADVANCED PRACTICE MIDWIFE | Admitting: ADVANCED PRACTICE MIDWIFE
Payer: COMMERCIAL

## 2019-04-13 VITALS — SYSTOLIC BLOOD PRESSURE: 113 MMHG | DIASTOLIC BLOOD PRESSURE: 72 MMHG | TEMPERATURE: 98.7 F | RESPIRATION RATE: 16 BRPM

## 2019-04-13 PROBLEM — Z36.89 ENCOUNTER FOR TRIAGE IN PREGNANT PATIENT: Status: ACTIVE | Noted: 2019-04-13

## 2019-04-13 LAB
ALBUMIN UR-MCNC: NEGATIVE MG/DL
AMPHETAMINES UR QL SCN: NEGATIVE
APPEARANCE UR: CLEAR
BACTERIA #/AREA URNS HPF: ABNORMAL /HPF
BILIRUB UR QL STRIP: NEGATIVE
CANNABINOIDS UR QL: NEGATIVE
COCAINE UR QL: NEGATIVE
COLOR UR AUTO: ABNORMAL
GLUCOSE UR STRIP-MCNC: NEGATIVE MG/DL
HGB UR QL STRIP: NEGATIVE
KETONES UR STRIP-MCNC: NEGATIVE MG/DL
LEUKOCYTE ESTERASE UR QL STRIP: NEGATIVE
NITRATE UR QL: NEGATIVE
OPIATES UR QL SCN: NEGATIVE
PCP UR QL SCN: NEGATIVE
PH UR STRIP: 7 PH (ref 5–7)
RBC #/AREA URNS AUTO: <1 /HPF (ref 0–2)
SOURCE: ABNORMAL
SP GR UR STRIP: 1 (ref 1–1.03)
UROBILINOGEN UR STRIP-MCNC: NORMAL MG/DL (ref 0–2)
WBC #/AREA URNS AUTO: 1 /HPF (ref 0–5)

## 2019-04-13 PROCEDURE — G0463 HOSPITAL OUTPT CLINIC VISIT: HCPCS | Mod: 25

## 2019-04-13 PROCEDURE — 59025 FETAL NON-STRESS TEST: CPT | Mod: 26 | Performed by: ADVANCED PRACTICE MIDWIFE

## 2019-04-13 PROCEDURE — 87591 N.GONORRHOEAE DNA AMP PROB: CPT | Performed by: ADVANCED PRACTICE MIDWIFE

## 2019-04-13 PROCEDURE — 87491 CHLMYD TRACH DNA AMP PROBE: CPT | Performed by: ADVANCED PRACTICE MIDWIFE

## 2019-04-13 PROCEDURE — 99213 OFFICE O/P EST LOW 20 MIN: CPT | Mod: 25 | Performed by: ADVANCED PRACTICE MIDWIFE

## 2019-04-13 PROCEDURE — 59025 FETAL NON-STRESS TEST: CPT

## 2019-04-13 PROCEDURE — 81001 URINALYSIS AUTO W/SCOPE: CPT | Mod: XU | Performed by: ADVANCED PRACTICE MIDWIFE

## 2019-04-13 PROCEDURE — 80307 DRUG TEST PRSMV CHEM ANLYZR: CPT | Performed by: ADVANCED PRACTICE MIDWIFE

## 2019-04-13 PROCEDURE — 87653 STREP B DNA AMP PROBE: CPT | Performed by: ADVANCED PRACTICE MIDWIFE

## 2019-04-13 NOTE — LETTER
April 15, 2019      Milenaglen Brumfield  [address] [address]        Dear ,    We are writing to inform you of your test results.    Your test results fall within the expected range(s) or remain unchanged from previous results.  Please continue with current treatment plan.    Resulted Orders   Drug abuse scrn 7 UR (/) (RH, SH, UR)   Result Value Ref Range    Amphetamine Qual Urine Negative NEG^Negative      Comment:      Cutoff for a negative amphetamine is 500 ng/mL or less.    Cannabinoids Qual Urine Negative NEG^Negative      Comment:      Cutoff for a negative cannabinoid is 50 ng/mL or less.    Cocaine Qual Urine Negative NEG^Negative      Comment:      Cutoff for a negative cocaine is 300 ng/mL or less.    Opiates Qualitative Urine Negative NEG^Negative      Comment:      Cutoff for a negative opiate is 300 ng/mL or less.    Pcp Qual Urine Negative NEG^Negative      Comment:      Cutoff for a negative PCP is 25 ng/mL or less.   Group B strep PCR   Result Value Ref Range    Group B Strep PCR Spec Eron Vaginal Rectal     Group B Strep PCR Negative NEG^Negative      Comment:      Assay performed on incubated broth culture of specimen using [employer] real-time   PCR.     Chlamydia trachomatis PCR   Result Value Ref Range    Specimen Description Cervix     Chlamydia Trachomatis PCR Negative NEG^Negative      Comment:      Negative for C. trachomatis rRNA by transcription mediated amplification.  A negative result by transcription mediated amplification does not preclude   the presence of C. trachomatis infection because results are dependent on   proper and adequate collection, absence of inhibitors, and sufficient rRNA to   be detected.     Neisseria gonorrhoea PCR   Result Value Ref Range    Specimen Descrip Cervix     N Gonorrhea PCR Negative NEG^Negative      Comment:      Negative for N. gonorrhoeae rRNA by transcription mediated amplification.  A negative result by  transcription mediated amplification does not preclude   the presence of N. gonorrhoeae infection because results are dependent on   proper and adequate collection, absence of inhibitors, and sufficient rRNA to   be detected.         If you have any questions or concerns, please call the clinic at the number listed above.       Sincerely,        No name on file.

## 2019-04-13 NOTE — PROGRESS NOTES
Fetal Non-Stress Test Results    NST Ordered By: Angie Mendenhall CNM        NST Start & Stop Times                                  NST Results  Fetus A   Baseline Rate: 140  Accelerations: Present  Decelerations: None  Interpretation: reactive    Angie Mendenhall CNM

## 2019-04-13 NOTE — TELEPHONE ENCOUNTER
Angie Mendenhall, midwife paged at 1:13 pm    Milena Brumfield, 1995,    Milena is 34W4d. Spoke to Edda Watters yesterday and was in [facility] ER for low pelvic pressure/pain.  Was instructed go into [facility] for eval. if worsens  [phone]  Emma MICHAELS RN [phone]    Per Edda's recommendation yesterday, Milena will go into [facility] for evaluation.

## 2019-04-13 NOTE — H&P
Milena Brumfield is a 23 year old female,   partnered      No LMP recorded (lmp unknown). Patient is pregnant.. Estimated Date of Delivery: May 21, 2019 is calculated from early U/S alone (<14wks)    Pt [facility] on 2019 for evaluation of pelvic pain    HPI Milena reports feeling lots of pelvic pain and pressure for about a week. She finds it difficult to change positions and walks with a waddle.  She presented to the ED in [facility] yesterday for such symptoms and was told at first that she was dilated to 3cm and then during a subsequent check that her external os was 3cm while her internal os was still fingertip. Her wet prep yesterday was positive for signs of bacterial vaginosis and given a prescription for metronidazole which she has taken for 3 doses. She states that she was told to call if her symptoms became worse. Today she felt like the pressure and discomfort became worse, so [facility]. She reports diarrhea for the past several days but constipation like feeling today. She reports an active baby. She denies vaginal bleeding, leaking of water, urinary concerns, contractions, nausea, flank pain, headache or other concerns.     PRENATAL COURSE  Prenatal care began at 5 wks gestation for a total of 7 prenatal visits.  Total wt gain 5  Prenatal vital signs WNL  Prenatal course was   complicated by    Patient Active Problem List    Diagnosis Date Noted     Encounter for triage in pregnant patient 2019     Priority: Medium     Supervision of other normal pregnancy, antepartum 2018     Priority: Medium     [facility] pt    3/2018 NIL pap in [facility] when she had her Nexplanon removed  18 [facility] U/S;   Anterior placenta, Le[place] 19  10 day discrepency from LMP LEATHA of 19,  Nl fetal survey except fetalbladder not seen  19 [facility] U/S;  Fetal bladder seen WNL.       Smoker 2015     Priority: Medium     Quit last pregnancy and almost quit at 20  wks.          Thyroid enlarged 06/25/2013     Priority: Medium     3/1/13- TSH- 0.6  6/25/13- enlarged but soft, TSH- 0.99  9/18/2018 thyroid WNL, TSH drawn;        Need for Tdap vaccination 06/25/2013     Priority: Medium       HISTORIES  Allergies   Allergen Reactions     Ceftin GI Disturbance     Cefuroxime Nausea     Hydrocodone-Acetaminophen Hives     Past Medical History:   Diagnosis Date     Anemia      Bipolar affective disorder (H)      Smoker     irreg use     Past Surgical History:   Procedure Laterality Date     NO HISTORY OF SURGERY       Family History   Problem Relation Age of Onset     Psychotic Disorder Mother         depression     Hypertension Maternal Grandfather      Diabetes Maternal Grandfather      Hypertension Maternal Grandmother      Social History     Tobacco Use     Smoking status: Current Some Day Smoker     Packs/day: 0.25     Years: 3.00     Pack years: 0.75     Types: Cigarettes     Smokeless tobacco: Never Used     Tobacco comment: cut down to every other day now.    Substance Use Topics     Alcohol use: Not Currently     Alcohol/week: 1.5 oz     Types: 3 Standard drinks or equivalent per week     Comment: weekends not with pregnancy        LABS:       Lab Results   Component Value Date    ABO AB 09/18/2018       Lab Results   Component Value Date    RH Pos 09/18/2018     Rhogam not indicated  Rubella immune   Treponema Pallidum Antibody  Negative    HIV    Non-reactive   Lab Results   Component Value Date    HGB 11.0 01/29/2019      Lab Results   Component Value Date    HEPBANG Nonreactive 09/18/2018     Lab Results   Component Value Date    GBS  09/10/2013     Positive: GBS DNA detected, presumed positive for GBS.   Assay performed on incubated broth culture of specimen using [employer] real-time   PCR.     other     ROS  C: NEGATIVE for fever, chills, change in weight  I: NEGATIVE for worrisome rashes, moles or lesions  E/M: NEGATIVE for ear, mouth and throat problems  R: NEGATIVE  for significant cough or SOB  CV: NEGATIVE for chest pain, palpitations or peripheral edema  GI: NEGATIVE for nausea, abdominal pain, heartburn, or change in bowel habits  : NEGATIVE for frequency, dysuria, or hematuria  PSYCHIATRIC:  NEGATIVE for depression, anxiety or other mental health concerns      PHYSICAL EXAM:  /72   Temp 98.7  F (37.1  C) (Oral)   Resp 16   LMP  (LMP Unknown)   General appearance healthy, alert and active  Neuro:  denies headache and visual disturbances  Psych: Mentation normal and bright   Legs: 2+/2+, no clonus, no edema       Abdomen: gravid, single vertex fetus, non-tender, EFW 4-5 lbs. Pt is not ashley    FETAL HEART TONES: baseline 140 with moderate FHR variability and accelerations.  No decelerations present.     MEMBRANES: Membranes are intact    PELVIC EXAM: fingertip/ 60%/ -2 and ballotable.  BLOODY SHOW:: no    Labs collected:   GCChlam  GBs  UA  UTox    ASSESSMENT:  IUP @ 34 wks 4d gestation in not in labor  NST  reactive    Pelvic girdle pain, likely r/t now cephalic position of baby.   Bacterial vaginosis (treated from yesterday's wet prep)      PLAN:  Home with PTL instructions per discharge instruction form  F/u in clinic next week.,   Recommend rest and hydration. Pregnancy support belt. Consider referral to physical therapy if needed.     Angie Mendenhall CNM

## 2019-04-13 NOTE — PLAN OF CARE
Data: [facility] at 1400.   Reason for maternal/fetal assessment per patient is Pelvic Pain  . Patient is a . Prenatal record reviewed.      OB History    Para Term  AB Living   4 1 1 0 2 1   SAB TAB Ectopic Multiple Live Births   0 2 0 0 1      # Outcome Date GA Lbr Aamir/2nd Weight Sex Delivery Anes PTL Lv   4 Current            3 TAB  9w0d          2 TAB  9w0d          1 Term 10/05/13 40w1d 08:20 / 00:24 3.289 kg (7 lb 4 oz) F Vag-Spont EPI N HENRY      Name: SHAVON,ALICE MENDOZA      Apgar1: 9  Apgar5: 9      Medical History:   Past Medical History:   Diagnosis Date     Anemia      Bipolar affective disorder (H)      Smoker     irreg use   . Gestational Age 34w4d. VSS. Cervix: fingertip dilated/60/-2.  Fetal movement present. Patient denies cramping, UTI symptoms, GI problems, vaginal bleeding, edema, headache, visual disturbances, epigastric or URQ pain, abdominal pain, rupture of membranes. Support persons are not present.  Action: Verbal consent for EFM. Triage assessment completed. Fetal assessment: Presumed adequate fetal oxygenation documented (see flow record). Patient instructed to report change in fetal movement, vaginal leaking of fluid or bleeding, abdominal pain, or any concerns related to the pregnancy to her nurse/physician.   Response: HELENE Mendenhall informed of patient arrival and status. Plan per provider is discharge to home with clinic follow-up. Patient verbalized understanding of education and verbalized agreement with plan. Discharged ambulatory at 1510.

## 2019-04-13 NOTE — DISCHARGE INSTRUCTIONS
Discharge Instruction for Undelivered Patients      You were seen for:  labor/pelvic pressure  We Consulted: HELENE Mendenhall  You had (Test or Medicine):Non-stress test, urine test, gonorrhea/chlamydia test, GBS     Diet:   Drink 8 to 12 glasses of liquids (milk, juice, water) every day.  You may eat meals and snacks.     Activity:  Call your doctor or nurse midwife if your baby is moving less than usual.     Call your provider if you notice:  Swelling in your face or increased swelling in your hands or legs.  Headaches that are not relieved by Tylenol (acetaminophen).  Changes in your vision (blurring: seeing spots or stars.)  Nausea (sick to your stomach) and vomiting (throwing up).   Weight gain of 5 pounds or more per week.  Heartburn that doesn't go away.  Signs of bladder infection: pain when you urinate (use the toilet), need to go more often and more urgently.  The bag of trujillo (rupture of membranes) breaks, or you notice leaking in your underwear.  Bright red blood in your underwear.  Abdominal (lower belly) or stomach pain.  Second (plus) baby: Contractions (tightening) less than 10 minutes apart and getting stronger.  Increase or change in vaginal discharge (note the color and amount)    Follow-up:  Make an appointment to be seen on next week with [facility] ([phone])

## 2019-04-14 LAB
C TRACH DNA SPEC QL NAA+PROBE: NEGATIVE
GP B STREP DNA SPEC QL NAA+PROBE: NEGATIVE
N GONORRHOEA DNA SPEC QL NAA+PROBE: NEGATIVE
SPECIMEN SOURCE: NORMAL

## 2019-04-22 ENCOUNTER — PRENATAL OFFICE VISIT (OUTPATIENT)
Dept: MIDWIFE SERVICES | Facility: CLINIC | Age: 24
End: 2019-04-22
Payer: COMMERCIAL

## 2019-04-22 VITALS
WEIGHT: 210 LBS | OXYGEN SATURATION: 96 % | BODY MASS INDEX: 34.99 KG/M2 | SYSTOLIC BLOOD PRESSURE: 122 MMHG | HEART RATE: 109 BPM | HEIGHT: 65 IN | DIASTOLIC BLOOD PRESSURE: 79 MMHG

## 2019-04-22 DIAGNOSIS — Z34.83 ENCOUNTER FOR SUPERVISION OF OTHER NORMAL PREGNANCY IN THIRD TRIMESTER: Primary | ICD-10-CM

## 2019-04-22 PROCEDURE — 99207 ZZC PRENATAL VISIT: CPT | Performed by: ADVANCED PRACTICE MIDWIFE

## 2019-04-22 ASSESSMENT — MIFFLIN-ST. JEOR: SCORE: 1695.49

## 2019-04-22 NOTE — PROGRESS NOTES
35w6d  Milena is here with her daughter today. Feeling well except lower back pain/pelvic pain. Pregnancy support belt give, she feels like it helps. Baby is active, denies bleeding, leaking of fluid, headaches. Ready for baby to come! GBS was negative on 4/13, did not collect hgb today. Please collect at next week's appointment. Return to care 1 week.   Rony Lemos CNM

## 2019-05-03 ENCOUNTER — PRENATAL OFFICE VISIT (OUTPATIENT)
Dept: MIDWIFE SERVICES | Facility: CLINIC | Age: 24
End: 2019-05-03
Payer: COMMERCIAL

## 2019-05-03 VITALS
SYSTOLIC BLOOD PRESSURE: 114 MMHG | HEART RATE: 86 BPM | DIASTOLIC BLOOD PRESSURE: 73 MMHG | WEIGHT: 213.5 LBS | BODY MASS INDEX: 36.08 KG/M2

## 2019-05-03 DIAGNOSIS — Z34.83 ENCOUNTER FOR SUPERVISION OF OTHER NORMAL PREGNANCY IN THIRD TRIMESTER: ICD-10-CM

## 2019-05-03 DIAGNOSIS — F51.02 ADJUSTMENT INSOMNIA: Primary | ICD-10-CM

## 2019-05-03 DIAGNOSIS — Z23 NEED FOR TDAP VACCINATION: ICD-10-CM

## 2019-05-03 PROCEDURE — 99207 ZZC PRENATAL VISIT: CPT | Performed by: ADVANCED PRACTICE MIDWIFE

## 2019-05-03 PROCEDURE — 90471 IMMUNIZATION ADMIN: CPT | Performed by: ADVANCED PRACTICE MIDWIFE

## 2019-05-03 PROCEDURE — 90715 TDAP VACCINE 7 YRS/> IM: CPT | Performed by: ADVANCED PRACTICE MIDWIFE

## 2019-05-03 RX ORDER — HYDROXYZINE PAMOATE 100 MG
1 CAPSULE ORAL AT BEDTIME
Qty: 30 CAPSULE | Refills: 0 | Status: ON HOLD | OUTPATIENT
Start: 2019-05-03 | End: 2019-05-14

## 2019-05-03 RX ORDER — QUETIAPINE FUMARATE 50 MG/1
TABLET, FILM COATED ORAL
Refills: 1 | COMMUNITY
Start: 2019-02-07

## 2019-05-03 NOTE — PROGRESS NOTES
Milena Brumfield is here for issues of lower back pain and pelvic pain related to term pregnancy.  Has been seen at [facility] where she is now living but plans of delivery at [place].  She did not have so much with her 1st child.  SVE with baby probably OP and presenting part pushing on supra pubic area.  2 cm at external os but also 2 cm thick so unable to strip membranes very effectively.  Recommended use of Vistaril to assist with sleep and body mechanics to relieve pressure.  No indication for IOL.   ASSESSMENT: 37w3d   Lower back and pelvic pain.   PLAN: RTC weekly until del.   JE

## 2019-05-10 ENCOUNTER — PRENATAL OFFICE VISIT (OUTPATIENT)
Dept: MIDWIFE SERVICES | Facility: CLINIC | Age: 24
End: 2019-05-10
Payer: COMMERCIAL

## 2019-05-10 VITALS
DIASTOLIC BLOOD PRESSURE: 71 MMHG | WEIGHT: 219 LBS | OXYGEN SATURATION: 100 % | HEART RATE: 107 BPM | SYSTOLIC BLOOD PRESSURE: 115 MMHG | BODY MASS INDEX: 37.01 KG/M2

## 2019-05-10 DIAGNOSIS — Z34.93 PRENATAL CARE IN THIRD TRIMESTER: Primary | ICD-10-CM

## 2019-05-10 PROCEDURE — 99207 ZZC PRENATAL VISIT: CPT | Performed by: ADVANCED PRACTICE MIDWIFE

## 2019-05-10 NOTE — PROGRESS NOTES
Feeling very tired of being pregnant.   Baby is active. Denies any leaking of fluid, vaginal bleeding, regular uterine contractions, or headaches or other concerns.  Cervical exam done per her request and attempt to sweep membranes.  She asked for induction.  I said that induction is offered at 41 weeks or sometimes if cervix ripe - but I said that cervix is still thick and baby is high.    Reviewed to call [phone] for contractions, loss of fluid, vaginal bleeding, decreased fetal movement or any other questions or concerns.    RTC in 1 weeks.  Anna Dumont, DNP, APRN, CNM

## 2019-05-11 ENCOUNTER — HOSPITAL ENCOUNTER (INPATIENT)
Facility: CLINIC | Age: 24
LOS: 3 days | Discharge: HOME | End: 2019-05-14
Attending: OBSTETRICS & GYNECOLOGY | Admitting: OBSTETRICS & GYNECOLOGY
Payer: COMMERCIAL

## 2019-05-11 LAB
ABO + RH BLD: NORMAL
ABO + RH BLD: NORMAL
AMPHETAMINES UR QL SCN: NEGATIVE
BASOPHILS # BLD AUTO: 0 10E9/L (ref 0–0.2)
BASOPHILS NFR BLD AUTO: 0.1 %
BLD GP AB SCN SERPL QL: NORMAL
BLOOD BANK CMNT PATIENT-IMP: NORMAL
CANNABINOIDS UR QL: NEGATIVE
COCAINE UR QL: NEGATIVE
DIFFERENTIAL METHOD BLD: ABNORMAL
EOSINOPHIL # BLD AUTO: 0.1 10E9/L (ref 0–0.7)
EOSINOPHIL NFR BLD AUTO: 0.8 %
ERYTHROCYTE [DISTWIDTH] IN BLOOD BY AUTOMATED COUNT: 13.8 % (ref 10–15)
HCT VFR BLD AUTO: 34.9 % (ref 35–47)
HGB BLD-MCNC: 11.5 G/DL (ref 11.7–15.7)
IMM GRANULOCYTES # BLD: 0 10E9/L (ref 0–0.4)
IMM GRANULOCYTES NFR BLD: 0.2 %
LYMPHOCYTES # BLD AUTO: 1.9 10E9/L (ref 0.8–5.3)
LYMPHOCYTES NFR BLD AUTO: 23 %
MCH RBC QN AUTO: 29.4 PG (ref 26.5–33)
MCHC RBC AUTO-ENTMCNC: 33 G/DL (ref 31.5–36.5)
MCV RBC AUTO: 89 FL (ref 78–100)
MONOCYTES # BLD AUTO: 0.5 10E9/L (ref 0–1.3)
MONOCYTES NFR BLD AUTO: 6.4 %
NEUTROPHILS # BLD AUTO: 5.8 10E9/L (ref 1.6–8.3)
NEUTROPHILS NFR BLD AUTO: 69.5 %
NRBC # BLD AUTO: 0 10*3/UL
NRBC BLD AUTO-RTO: 0 /100
OPIATES UR QL SCN: NEGATIVE
PCP UR QL SCN: NEGATIVE
PLATELET # BLD AUTO: 168 10E9/L (ref 150–450)
RBC # BLD AUTO: 3.91 10E12/L (ref 3.8–5.2)
RUPTURE OF FETAL MEMBRANES BY ROM PLUS: NEGATIVE
SPECIMEN EXP DATE BLD: NORMAL
WBC # BLD AUTO: 8.3 10E9/L (ref 4–11)

## 2019-05-11 PROCEDURE — 36415 COLL VENOUS BLD VENIPUNCTURE: CPT | Performed by: OBSTETRICS & GYNECOLOGY

## 2019-05-11 PROCEDURE — 59025 FETAL NON-STRESS TEST: CPT

## 2019-05-11 PROCEDURE — 86780 TREPONEMA PALLIDUM: CPT | Performed by: OBSTETRICS & GYNECOLOGY

## 2019-05-11 PROCEDURE — G0463 HOSPITAL OUTPT CLINIC VISIT: HCPCS | Mod: 25

## 2019-05-11 PROCEDURE — 86901 BLOOD TYPING SEROLOGIC RH(D): CPT | Performed by: OBSTETRICS & GYNECOLOGY

## 2019-05-11 PROCEDURE — 86850 RBC ANTIBODY SCREEN: CPT | Performed by: OBSTETRICS & GYNECOLOGY

## 2019-05-11 PROCEDURE — 12000000 ZZH R&B MED SURG/OB

## 2019-05-11 PROCEDURE — G0463 HOSPITAL OUTPT CLINIC VISIT: HCPCS

## 2019-05-11 PROCEDURE — 84112 EVAL AMNIOTIC FLUID PROTEIN: CPT | Performed by: OBSTETRICS & GYNECOLOGY

## 2019-05-11 PROCEDURE — 80307 DRUG TEST PRSMV CHEM ANLYZR: CPT | Performed by: OBSTETRICS & GYNECOLOGY

## 2019-05-11 PROCEDURE — 85025 COMPLETE CBC W/AUTO DIFF WBC: CPT | Performed by: OBSTETRICS & GYNECOLOGY

## 2019-05-11 PROCEDURE — 86900 BLOOD TYPING SEROLOGIC ABO: CPT | Performed by: OBSTETRICS & GYNECOLOGY

## 2019-05-11 PROCEDURE — 40000809 ZZH STATISTIC NO DOCUMENTATION TO SUPPORT CHARGE

## 2019-05-11 RX ORDER — NALOXONE HYDROCHLORIDE 0.4 MG/ML
.1-.4 INJECTION, SOLUTION INTRAMUSCULAR; INTRAVENOUS; SUBCUTANEOUS
Status: DISCONTINUED | OUTPATIENT
Start: 2019-05-11 | End: 2019-05-12

## 2019-05-11 RX ORDER — CARBOPROST TROMETHAMINE 250 UG/ML
250 INJECTION, SOLUTION INTRAMUSCULAR
Status: DISCONTINUED | OUTPATIENT
Start: 2019-05-11 | End: 2019-05-12

## 2019-05-11 RX ORDER — FENTANYL CITRATE 50 UG/ML
50-100 INJECTION, SOLUTION INTRAMUSCULAR; INTRAVENOUS
Status: DISCONTINUED | OUTPATIENT
Start: 2019-05-11 | End: 2019-05-12

## 2019-05-11 RX ORDER — OXYTOCIN/0.9 % SODIUM CHLORIDE 30/500 ML
100-340 PLASTIC BAG, INJECTION (ML) INTRAVENOUS CONTINUOUS PRN
Status: COMPLETED | OUTPATIENT
Start: 2019-05-11 | End: 2019-05-12

## 2019-05-11 RX ORDER — OXYTOCIN 10 [USP'U]/ML
10 INJECTION, SOLUTION INTRAMUSCULAR; INTRAVENOUS
Status: DISCONTINUED | OUTPATIENT
Start: 2019-05-11 | End: 2019-05-12

## 2019-05-11 RX ORDER — SODIUM CHLORIDE, SODIUM LACTATE, POTASSIUM CHLORIDE, CALCIUM CHLORIDE 600; 310; 30; 20 MG/100ML; MG/100ML; MG/100ML; MG/100ML
INJECTION, SOLUTION INTRAVENOUS CONTINUOUS
Status: DISCONTINUED | OUTPATIENT
Start: 2019-05-11 | End: 2019-05-12

## 2019-05-11 RX ORDER — ONDANSETRON 2 MG/ML
4 INJECTION INTRAMUSCULAR; INTRAVENOUS EVERY 6 HOURS PRN
Status: DISCONTINUED | OUTPATIENT
Start: 2019-05-11 | End: 2019-05-12

## 2019-05-11 RX ORDER — ACETAMINOPHEN 325 MG/1
650 TABLET ORAL EVERY 4 HOURS PRN
Status: DISCONTINUED | OUTPATIENT
Start: 2019-05-11 | End: 2019-05-12

## 2019-05-11 RX ORDER — LIDOCAINE 40 MG/G
CREAM TOPICAL
Status: DISCONTINUED | OUTPATIENT
Start: 2019-05-11 | End: 2019-05-12

## 2019-05-11 RX ORDER — IBUPROFEN 400 MG/1
800 TABLET, FILM COATED ORAL
Status: DISCONTINUED | OUTPATIENT
Start: 2019-05-11 | End: 2019-05-12

## 2019-05-11 RX ORDER — METHYLERGONOVINE MALEATE 0.2 MG/ML
200 INJECTION INTRAVENOUS
Status: DISCONTINUED | OUTPATIENT
Start: 2019-05-11 | End: 2019-05-12

## 2019-05-11 ASSESSMENT — MIFFLIN-ST. JEOR: SCORE: 1719.31

## 2019-05-12 ENCOUNTER — ANESTHESIA EVENT (OUTPATIENT)
Dept: OBGYN | Facility: CLINIC | Age: 24
End: 2019-05-12
Payer: COMMERCIAL

## 2019-05-12 ENCOUNTER — ANESTHESIA (OUTPATIENT)
Dept: OBGYN | Facility: CLINIC | Age: 24
End: 2019-05-12
Payer: COMMERCIAL

## 2019-05-12 LAB — T PALLIDUM AB SER QL: NONREACTIVE

## 2019-05-12 PROCEDURE — 00HU33Z INSERTION OF INFUSION DEVICE INTO SPINAL CANAL, PERCUTANEOUS APPROACH: ICD-10-PCS | Performed by: ANESTHESIOLOGY

## 2019-05-12 PROCEDURE — 27110038 ZZH RX 271: Performed by: ANESTHESIOLOGY

## 2019-05-12 PROCEDURE — 25000132 ZZH RX MED GY IP 250 OP 250 PS 637: Performed by: OBSTETRICS & GYNECOLOGY

## 2019-05-12 PROCEDURE — 25000125 ZZHC RX 250: Performed by: OBSTETRICS & GYNECOLOGY

## 2019-05-12 PROCEDURE — 37000011 ZZH ANESTHESIA WARD SERVICE

## 2019-05-12 PROCEDURE — 0KQM0ZZ REPAIR PERINEUM MUSCLE, OPEN APPROACH: ICD-10-PCS | Performed by: OBSTETRICS & GYNECOLOGY

## 2019-05-12 PROCEDURE — 25000128 H RX IP 250 OP 636: Performed by: ANESTHESIOLOGY

## 2019-05-12 PROCEDURE — 25000125 ZZHC RX 250: Performed by: ANESTHESIOLOGY

## 2019-05-12 PROCEDURE — 25800030 ZZH RX IP 258 OP 636: Performed by: OBSTETRICS & GYNECOLOGY

## 2019-05-12 PROCEDURE — 12000035 ZZH R&B POSTPARTUM

## 2019-05-12 PROCEDURE — 72200001 ZZH LABOR CARE VAGINAL DELIVERY SINGLE

## 2019-05-12 PROCEDURE — 3E0R3BZ INTRODUCTION OF ANESTHETIC AGENT INTO SPINAL CANAL, PERCUTANEOUS APPROACH: ICD-10-PCS | Performed by: ANESTHESIOLOGY

## 2019-05-12 PROCEDURE — 10907ZC DRAINAGE OF AMNIOTIC FLUID, THERAPEUTIC FROM PRODUCTS OF CONCEPTION, VIA NATURAL OR ARTIFICIAL OPENING: ICD-10-PCS | Performed by: OBSTETRICS & GYNECOLOGY

## 2019-05-12 RX ORDER — OXYTOCIN 10 [USP'U]/ML
10 INJECTION, SOLUTION INTRAMUSCULAR; INTRAVENOUS
Status: DISCONTINUED | OUTPATIENT
Start: 2019-05-12 | End: 2019-05-14 | Stop reason: HOSPADM

## 2019-05-12 RX ORDER — NALOXONE HYDROCHLORIDE 0.4 MG/ML
.1-.4 INJECTION, SOLUTION INTRAMUSCULAR; INTRAVENOUS; SUBCUTANEOUS
Status: DISCONTINUED | OUTPATIENT
Start: 2019-05-12 | End: 2019-05-14 | Stop reason: HOSPADM

## 2019-05-12 RX ORDER — IBUPROFEN 400 MG/1
800 TABLET, FILM COATED ORAL EVERY 6 HOURS PRN
Status: DISCONTINUED | OUTPATIENT
Start: 2019-05-12 | End: 2019-05-14 | Stop reason: HOSPADM

## 2019-05-12 RX ORDER — LIDOCAINE HYDROCHLORIDE 5 MG/ML
INJECTION, SOLUTION INFILTRATION; PERINEURAL PRN
Status: DISCONTINUED | OUTPATIENT
Start: 2019-05-12 | End: 2019-05-13 | Stop reason: HOSPADM

## 2019-05-12 RX ORDER — LIDOCAINE HYDROCHLORIDE AND EPINEPHRINE 15; 5 MG/ML; UG/ML
INJECTION, SOLUTION EPIDURAL PRN
Status: DISCONTINUED | OUTPATIENT
Start: 2019-05-12 | End: 2019-05-13 | Stop reason: HOSPADM

## 2019-05-12 RX ORDER — NALOXONE HYDROCHLORIDE 0.4 MG/ML
.1-.4 INJECTION, SOLUTION INTRAMUSCULAR; INTRAVENOUS; SUBCUTANEOUS
Status: DISCONTINUED | OUTPATIENT
Start: 2019-05-12 | End: 2019-05-12

## 2019-05-12 RX ORDER — EPHEDRINE SULFATE 50 MG/ML
5 INJECTION, SOLUTION INTRAMUSCULAR; INTRAVENOUS; SUBCUTANEOUS
Status: DISCONTINUED | OUTPATIENT
Start: 2019-05-12 | End: 2019-05-12

## 2019-05-12 RX ORDER — BISACODYL 10 MG
10 SUPPOSITORY, RECTAL RECTAL DAILY PRN
Status: DISCONTINUED | OUTPATIENT
Start: 2019-05-14 | End: 2019-05-14 | Stop reason: HOSPADM

## 2019-05-12 RX ORDER — LANOLIN 100 %
OINTMENT (GRAM) TOPICAL
Status: DISCONTINUED | OUTPATIENT
Start: 2019-05-12 | End: 2019-05-14 | Stop reason: HOSPADM

## 2019-05-12 RX ORDER — HYDROCORTISONE 2.5 %
CREAM (GRAM) TOPICAL 3 TIMES DAILY PRN
Status: DISCONTINUED | OUTPATIENT
Start: 2019-05-12 | End: 2019-05-14 | Stop reason: HOSPADM

## 2019-05-12 RX ORDER — AMOXICILLIN 250 MG
2 CAPSULE ORAL 2 TIMES DAILY
Status: DISCONTINUED | OUTPATIENT
Start: 2019-05-12 | End: 2019-05-14 | Stop reason: HOSPADM

## 2019-05-12 RX ORDER — ACETAMINOPHEN 325 MG/1
650 TABLET ORAL EVERY 4 HOURS PRN
Status: DISCONTINUED | OUTPATIENT
Start: 2019-05-12 | End: 2019-05-14 | Stop reason: HOSPADM

## 2019-05-12 RX ORDER — OXYTOCIN/0.9 % SODIUM CHLORIDE 30/500 ML
1-24 PLASTIC BAG, INJECTION (ML) INTRAVENOUS CONTINUOUS
Status: DISCONTINUED | OUTPATIENT
Start: 2019-05-12 | End: 2019-05-12

## 2019-05-12 RX ORDER — AMOXICILLIN 250 MG
1 CAPSULE ORAL 2 TIMES DAILY
Status: DISCONTINUED | OUTPATIENT
Start: 2019-05-12 | End: 2019-05-14 | Stop reason: HOSPADM

## 2019-05-12 RX ORDER — OXYTOCIN/0.9 % SODIUM CHLORIDE 30/500 ML
340 PLASTIC BAG, INJECTION (ML) INTRAVENOUS CONTINUOUS PRN
Status: DISCONTINUED | OUTPATIENT
Start: 2019-05-12 | End: 2019-05-14 | Stop reason: HOSPADM

## 2019-05-12 RX ORDER — ROPIVACAINE HYDROCHLORIDE 2 MG/ML
INJECTION, SOLUTION EPIDURAL; INFILTRATION; PERINEURAL PRN
Status: DISCONTINUED | OUTPATIENT
Start: 2019-05-12 | End: 2019-05-13 | Stop reason: HOSPADM

## 2019-05-12 RX ORDER — OXYTOCIN/0.9 % SODIUM CHLORIDE 30/500 ML
100 PLASTIC BAG, INJECTION (ML) INTRAVENOUS CONTINUOUS
Status: DISCONTINUED | OUTPATIENT
Start: 2019-05-12 | End: 2019-05-14 | Stop reason: HOSPADM

## 2019-05-12 RX ADMIN — LIDOCAINE HYDROCHLORIDE 5 ML: 5 INJECTION, SOLUTION INFILTRATION; PERINEURAL at 04:06

## 2019-05-12 RX ADMIN — ACETAMINOPHEN 650 MG: 325 TABLET, FILM COATED ORAL at 15:03

## 2019-05-12 RX ADMIN — ACETAMINOPHEN 650 MG: 325 TABLET, FILM COATED ORAL at 21:35

## 2019-05-12 RX ADMIN — ROPIVACAINE HYDROCHLORIDE 5 ML: 2 INJECTION, SOLUTION EPIDURAL; INFILTRATION at 04:07

## 2019-05-12 RX ADMIN — IBUPROFEN 800 MG: 400 TABLET ORAL at 23:05

## 2019-05-12 RX ADMIN — IBUPROFEN 800 MG: 400 TABLET ORAL at 16:36

## 2019-05-12 RX ADMIN — SODIUM CHLORIDE, POTASSIUM CHLORIDE, SODIUM LACTATE AND CALCIUM CHLORIDE: 600; 310; 30; 20 INJECTION, SOLUTION INTRAVENOUS at 04:53

## 2019-05-12 RX ADMIN — LIDOCAINE HYDROCHLORIDE,EPINEPHRINE BITARTRATE 3 ML: 15; .005 INJECTION, SOLUTION EPIDURAL; INFILTRATION; INTRACAUDAL; PERINEURAL at 04:04

## 2019-05-12 RX ADMIN — OXYTOCIN-SODIUM CHLORIDE 0.9% IV SOLN 30 UNIT/500ML 340 ML/HR: 30-0.9/5 SOLUTION at 09:40

## 2019-05-12 RX ADMIN — ACETAMINOPHEN 650 MG: 325 TABLET, FILM COATED ORAL at 10:47

## 2019-05-12 RX ADMIN — SODIUM CHLORIDE, POTASSIUM CHLORIDE, SODIUM LACTATE AND CALCIUM CHLORIDE: 600; 310; 30; 20 INJECTION, SOLUTION INTRAVENOUS at 02:54

## 2019-05-12 RX ADMIN — SENNOSIDES AND DOCUSATE SODIUM 1 TABLET: 8.6; 5 TABLET ORAL at 21:35

## 2019-05-12 RX ADMIN — IBUPROFEN 800 MG: 400 TABLET ORAL at 10:47

## 2019-05-12 RX ADMIN — Medication 12 ML/HR: at 04:06

## 2019-05-12 RX ADMIN — OXYTOCIN-SODIUM CHLORIDE 0.9% IV SOLN 30 UNIT/500ML 2 MILLI-UNITS/MIN: 30-0.9/5 SOLUTION at 05:50

## 2019-05-12 ASSESSMENT — COPD QUESTIONNAIRES: COPD: 0

## 2019-05-12 ASSESSMENT — LIFESTYLE VARIABLES: TOBACCO_USE: 1

## 2019-05-12 NOTE — PLAN OF CARE
Pt requests nitrous oxide for pain management.  Consent printed, patient reads and signs consent.  Instructions given, patient demonstrates appropriate use.

## 2019-05-12 NOTE — ANESTHESIA PREPROCEDURE EVALUATION
Anesthesia Pre-Procedure Evaluation    Patient: Milena Brumfield   MRN: [id number] : 1995          Preoperative Diagnosis: * No surgery found *        Past Medical History:   Diagnosis Date     Anemia      Bipolar affective disorder (H)      Smoker     irreg use     Past Surgical History:   Procedure Laterality Date     NO HISTORY OF SURGERY         Anesthesia Evaluation       history and physical reviewed . Pt has had prior anesthetic.            ROS/MED HX    ENT/Pulmonary:  - neg pulmonary ROS   (+)tobacco use, , . .   (-) asthma and COPD   Neurologic:  - neg neurologic ROS    (-) CVA, TIA and Neuropathy   Cardiovascular:  - neg cardiovascular ROS      (-) hypertension, CAD, irregular heartbeat/palpitations and stent   METS/Exercise Tolerance:     Hematologic:        (-) anemia   Musculoskeletal:   (+)  other musculoskeletal- low back pain      GI/Hepatic:  - neg GI/hepatic ROS      (-) GERD and liver disease   Renal/Genitourinary:      (-) renal disease   Endo:     (+) thyroid problem .   (-) Type I DM and Type II DM   Psychiatric:         Infectious Disease:  - neg infectious disease ROS       Malignancy:         Other:                     neg OB ROS            Physical Exam  Normal systems: cardiovascular, pulmonary and dental    Airway   Mallampati: II  TM distance: >3 FB  Neck ROM: full  Mouth opening: > 3 cm    Dental     Cardiovascular   Rhythm and rate: regular and normal      Pulmonary    breath sounds clear to auscultation            Lab Results   Component Value Date    WBC 8.3 2019    HGB 11.5 (L) 2019    HCT 34.9 (L) 2019     2019     2016    POTASSIUM 3.7 2016    CHLORIDE 107 2016    CO2 24 2016    BUN 6 (L) 2016    CR 0.41 (L) 2016    GLC 87 2016    KEVIN 7.9 (L) 2016    ALBUMIN 3.4 10/28/2014    PROTTOTAL 7.3 10/28/2014    ALT 26 10/28/2014    AST 14 10/28/2014    ALKPHOS 81 10/28/2014    BILITOTAL 0.2  "10/28/2014    TSH 2.32 09/18/2018    HCGS Negative 10/28/2014       Preop Vitals  BP Readings from Last 3 Encounters:   05/12/19 118/71   05/10/19 115/71   05/03/19 114/73    Pulse Readings from Last 3 Encounters:   05/11/19 100   05/10/19 107   05/03/19 86      Resp Readings from Last 3 Encounters:   05/12/19 16   04/13/19 16   04/01/16 14    SpO2 Readings from Last 3 Encounters:   05/10/19 100%   04/22/19 96%   02/19/19 92%      Temp Readings from Last 1 Encounters:   05/12/19 36.4  C (97.6  F) (Oral)    Ht Readings from Last 1 Encounters:   05/11/19 1.626 m (5' 4\")      Wt Readings from Last 1 Encounters:   05/11/19 98.4 kg (217 lb)    Estimated body mass index is 37.25 kg/m  as calculated from the following:    Height as of this encounter: 1.626 m (5' 4\").    Weight as of this encounter: 98.4 kg (217 lb).       Anesthesia Plan      History & Physical Review      ASA Status:  2 .  OB Epidural Asa: 2       Plan for Epidural          Postoperative Care      Consents  Anesthetic plan, risks, benefits and alternatives discussed with:  Patient..                 Clement Pena MD  "

## 2019-05-12 NOTE — PLAN OF CARE
Dr. BUDDY Garcia at bedside; performs SVE with AROM, clear fluid noted.  Monitor strip reviewed, POC discussed.

## 2019-05-12 NOTE — PLAN OF CARE
Pt verbalized to other Rn that she goes to Dr. Murrieta at [facility] and has an appointment with him next week. She plans to ask him about her Seroquel at that point and prefers to not take anything while in the hospital at this point.

## 2019-05-12 NOTE — PLAN OF CARE
Delivery of viable boy. Apgars 8 and 9. NNP Catalina present for delivery due to vacuum assist and recurrent lates.

## 2019-05-12 NOTE — ANESTHESIA PROCEDURE NOTES
Peripheral nerve/Neuraxial procedure note : epidural catheter  Pre-Procedure  Performed by Clement Pena MD  Location: OB      Pre-Anesthestic Checklist: patient identified, IV checked, risks and benefits discussed, informed consent, monitors and equipment checked and pre-op evaluation    Timeout  Correct Patient: Yes   Correct Procedure: Yes   Correct Site: Yes     Correct Position: Yes     .   Procedure Documentation    .    Procedure:    Epidural catheter.  Insertion Site:L4-5  (midline approach) Injection technique: LORT saline   Local skin infiltrated with 3 mL of 2% lidocaine.  JOSE at 5.5 cm     Patient Prep;mask, sterile gloves, povidone-iodine 7.5% surgical scrub, patient draped.  .  Needle: Touhy needle Needle Gauge: 17.    Needle Length (Inches) 3.5  # of attempts: 1 and # of redirects:  .   Catheter: 19 G . .  Catheter threaded easily  3 cm epidural space.  8.5 cm at skin.   .    Assessment/Narrative  Paresthesias: No.  .  .  Aspiration negative for heme or CSF  . Test dose of 3 mL lidocaine 1.5% w/ 1:200,000 epinephrine at. Test dose negative for signs of intravascular, subdural or intrathecal injection.

## 2019-05-12 NOTE — PLAN OF CARE
"Pt arrives to MAC for rule out labor at 1959. After obtaining verbal consent pt placed on FHR monitor and [address]. Pt reports feeling contractions since the evening of 5/10/19. Pt states also \"having more leaking today, I was unsure if it was urine or something else\". ROM+ collected and sent, and SVE performed.  2022: Monitor adjusted as well as maternal position.  2105: Repeat SVE performed and unchanged.  2203: Repeat SVE results in change as documented. Plan of care made by Dr Garcia for admission to labor and delivery.   2220: Report given to KELSEY Qureshi RN pt care overturned. Pt ambulates from MAC to room 220 independently with RN.  "

## 2019-05-12 NOTE — PLAN OF CARE
Vitals stable. Feels well. Denies pain. Voiding. IV out. Bottle feeding baby. Will continue to monitor.

## 2019-05-12 NOTE — PROVIDER NOTIFICATION
05/11/19 2213   Provider Notification   Provider Name/Title Dr Garcia   Method of Notification Electronic Page;Phone   Request Evaluate - Remote   Notification Reason SVE;Status Update;Decels   Dr Garcia updated regarding change in patient cervical exam and deceleration during cervical exam. Orders received over telephone to admit patient. Per Dr Garcia unable to enter orders remotely at this time.

## 2019-05-12 NOTE — PLAN OF CARE
4 para 1 at 38 4/7 weeks gestation here with spontaneous labor.  External monitors applied, assessment and admission completed.  GBS negative.  Pt plans for natural labor and delivery.  FOB is expected to arrive for support during labor and delivery, will receive second baby band.  Meets criteria for toxicology screening due to maternal smoking; urine collected and sent for patient.  Pt denies needs at this time.

## 2019-05-12 NOTE — PLAN OF CARE
Vss, fundus firm with scant flow. Wearing tucks to perineum and hemorrhoids. Pain managed with tylenol and ibuprofen. Pt voiding spontaneously. Ambulating around room. Bottle feeding  and attentive to . Significant other present at bedside. Encouraged to call with questions.

## 2019-05-12 NOTE — PROVIDER NOTIFICATION
05/11/19 2113   Provider Notification   Provider Name/Title Dr Garcia   Method of Notification Electronic Page;Phone   Request Evaluate - Remote   Notification Reason Patient Arrived;SVE;Labor Status;Decels   Dr Garcia made aware of pt arrival , FHR monitoring, negative ROM+ result, and unchanged cervical exam. Episodic variable discussed and per Dr Garcia to monitor until 2200, recheck cervix. If unchanged and reactive NST to discharge home. Will contact provider with cervical change or decelerations for reassessment and change in plan of care.

## 2019-05-12 NOTE — H&P
[facility] Labor and Delivery History and Physical    Milena Brumfield MRN# [id number]   Age: 23 year old YOB: 1995     Date of Admission:  2019    Primary care provider: Dennise Peralta           Chief Complaint:   Milena Brumfield is a 23 year old female who is 38w5d pregnant and being admitted for active labor management.    This patient is unassigned and unknown to me - has been seen by the Midwives group at [facility]. Records reviewed. Social concerns - mental health concerns (bipolar), CPS case pending with her daughter, questionable drug use history.           Pregnancy history:     OBSTETRIC HISTORY:    OB History    Para Term  AB Living   4 1 1 0 2 1   SAB TAB Ectopic Multiple Live Births   0 2 0 0 1      # Outcome Date GA Lbr Aamir/2nd Weight Sex Delivery Anes PTL Lv   4 Current            3 TAB 2018 9w0d          2 TAB  9w0d          1 Term 10/05/13 40w1d 08:20 / 00:24 3.289 kg (7 lb 4 oz) F Vag-Spont EPI N HENRY      Name: ALICE BRUMFIELD      Apgar1: 9  Apgar5: 9       EDC: Estimated Date of Delivery: 19    Prenatal Labs:   Lab Results   Component Value Date    ABO AB 2019    RH Pos 2019    AS Neg 2019    HEPBANG Nonreactive 2018    CHPCRT Negative 2019    GCPCRT Negative 2019    TREPAB Negative 2013    RUBELLAABIGG Immune 2013    HGB 11.5 (L) 2019       GBS Status:   Lab Results   Component Value Date    GBS Negative 2019       Active Problem List  Patient Active Problem List   Diagnosis     Thyroid enlarged     Need for Tdap vaccination     Smoker     Supervision of other normal pregnancy, antepartum     Encounter for triage in pregnant patient     Indication for care in labor or delivery       Medication Prior to Admission  Medications Prior to Admission   Medication Sig Dispense Refill Last Dose     hydrOXYzine (VISTARIL) 100 MG capsule Take 1 capsule (100 mg) by mouth At  Bedtime 30 capsule 0 Past Month at Unknown time     Prenatal Vit-Fe Fumarate-FA (PRENATAL MULTIVITAMIN PLUS IRON) 27-0.8 MG TABS per tablet Take 1 tablet by mouth daily 100 tablet 3 Taking     QUEtiapine (SEROQUEL) 50 MG tablet TK 1 T PO  HS. CAN INCREASE TO 2 TS AT BEDTIME IF NEEDED FOR SLEEP/MOOD/ANXIETY  1 Taking   .        Maternal Past Medical History:     Past Medical History:   Diagnosis Date     Anemia      Bipolar affective disorder (H)      Smoker     irreg use                       Family History:   This patient has no significant family history            Social History:   I have reviewed this patient's social history         Review of Systems:   The Review of Systems is negative other than noted in the HPI          Physical Exam:   Vitals were reviewed  Lungs:   No increased work of breathing, good air exchange, clear to auscultation bilaterally, no crackles or wheezing     Cardiovascular:   Normal apical impulse, regular rate and rhythm, normal S1 and S2, no S3 or S4, and no murmur noted     Abdomen:   No scars, normal bowel sounds, soft, non-distended, non-tender, no masses palpated, no hepatosplenomegally      Cervix:   Membranes: intact   Dilation: 7   Effacement: 70%   Station:-1   Consistency: average   Position: Posterior  Presentation:Cephalic  Fetal Heart Rate Tracing: reactive and reassuring  Tocometer: external monitor                       Assessment:   Milena Brumfield is a 38w5d pregnant female admitted with active labor management.          Plan:   Admit  AROM'd for clear fluid after risks/benefits discussed with patient.   GBS neg  Social work consult postpartum  Drug screening    Meaghan Garcia MD

## 2019-05-12 NOTE — PLAN OF CARE
"Pt transferred, ambulatory, to 220 for labor.  Pt states FOB is on his way.  They are not in a relationship, he is just \"around\".  Plans to be here for labor and delivery.  FOB is okay to have second baby band.  "

## 2019-05-12 NOTE — PLAN OF CARE
Data: Milena Brumfield transferred to 409 via wheelchair at 1215. Baby transferred via parent's arms.  Action: Receiving unit notified of transfer: Yes. Patient and family notified of room change. Report given to Milvia ANGELA at 1215. Belongings sent to receiving unit. Accompanied by Registered Nurse. Oriented patient to surroundings. Call light within reach. ID bands double-checked with receiving RN.  Response: Patient tolerated transfer and is stable.

## 2019-05-12 NOTE — L&D DELIVERY NOTE
VAGINAL DELIVERY NOTE    Date of Delivery:  2019    Milena Brumfield is a 23 year old  who was admitted at 38w5d due to active labor.   Her prenatal course was significant for normal care with midwive group at [facility]- she was an unassigned patient for our group.   Her GBS was negative.  After admission to Labor and Delivery, the patient eventually was started on pitocin. Patient had regular contractions.   She received epidural for pain.  She has SROM with clear fluid at 4 cm dilated.   During the first stage the FHRT was category I.     The patient progressed to complete dilation. The FHT's were category 2 with variable decels which then became late decels at times and were monitored with external monitors.  She pushed with good effort for 1 hour. The FHT's had late decels and was 4+/5+ station and vacuum assisted delivery was discussed as they were becoming deeper.   Addendum for vacuum-assisted delivery:             Prior to employing the Kiwi vacuum device, the patient was informed about the potential risks of using the vacuum, including but not limited to the risk of fetal cephalohematoma and possible injury to the fetal scalp or skull. Possible alternative labor strategies were discussed including the option of  section. Informed verbal consent was obtained from the patient.  When the decision was made to employ the vacuum, arrangements were made to have the OR crew available for  section in case it was needed, and surgical and  resuscitation personnel were available.       The bladder was drained prior to any vacuum attempt. Then the Kiwi vacuum was applied to the fetal vertex. The fetal vertex was at  4+/5+ station in an LOP position and the estimated fetal weight was 8 pounds and 6 ounces by my Leopold's.   The Kiwi was applied at 4+/5+ station LOP and 3 pulls for 20 seconds, 30 seconds and 30 seconds with 3 popoffs resulted in descent of vertex to introitus and FHRT improved so pt  allowed to keep pushing for 20 more minutes.    She then became exhausted and she requested the vacuum to help deliver. The Mityvac mushroom cup was then used at 5+/5+ station and 1 pull for 10 seconds LOP resulted delivery over perineum.    The fetal vertex delivered over an intact perineum in an LOP position. A nuchal cord was present and was reduced as baby delivered. The remainder of the baby was delivered easily- a viable male infant with Apgars of 8/9. There was no shoulder dystocia present. The baby was placed on the maternal abdomen and delayed cord clamping was done. The weight was not done immediately so as to maximize bonding and skin to skin contact. The scalp was examined and there were no concerning finding.  The patient received IV pitocin immediately after delivery. Cord blood was obtained.  The placenta delivered spontaneously intact and was not sent to pathology.     There were 2nd degree posterior perineal laceration that was repaired with 3-0 Vicryl in the standard fashion with good hemostasis.  The vagina and perineum were inspected and no additional tears noted. Count were correct and the fundus was massaged with the RN and it was firm with no active clots or bleeding.   EBL= 200 ml.    Mother and infant were doing well.      No gross fetal defects were observed.  The baby was stable in mom's arms.  The mother was stable in the labor bed.  Sponge and sharp count is correct. There were no complications.    Monica Keene MD

## 2019-05-12 NOTE — PLAN OF CARE
Transferred from labor and delivery at 1210 by wheelchair with baby in arms. Oriented to room and unit. Will continue to monitor.

## 2019-05-13 PROCEDURE — 12000035 ZZH R&B POSTPARTUM

## 2019-05-13 PROCEDURE — 25000132 ZZH RX MED GY IP 250 OP 250 PS 637: Performed by: OBSTETRICS & GYNECOLOGY

## 2019-05-13 RX ORDER — CYCLOBENZAPRINE HCL 10 MG
10 TABLET ORAL 3 TIMES DAILY PRN
Status: DISCONTINUED | OUTPATIENT
Start: 2019-05-13 | End: 2019-05-14 | Stop reason: HOSPADM

## 2019-05-13 RX ORDER — OXYCODONE HYDROCHLORIDE 5 MG/1
5 TABLET ORAL EVERY 6 HOURS PRN
Status: DISCONTINUED | OUTPATIENT
Start: 2019-05-13 | End: 2019-05-14 | Stop reason: HOSPADM

## 2019-05-13 RX ADMIN — CYCLOBENZAPRINE HYDROCHLORIDE 10 MG: 10 TABLET, FILM COATED ORAL at 00:41

## 2019-05-13 RX ADMIN — ACETAMINOPHEN 650 MG: 325 TABLET, FILM COATED ORAL at 05:08

## 2019-05-13 RX ADMIN — ACETAMINOPHEN 650 MG: 325 TABLET, FILM COATED ORAL at 16:41

## 2019-05-13 RX ADMIN — IBUPROFEN 800 MG: 400 TABLET ORAL at 05:08

## 2019-05-13 RX ADMIN — OXYCODONE HYDROCHLORIDE 5 MG: 5 TABLET ORAL at 06:53

## 2019-05-13 RX ADMIN — CYCLOBENZAPRINE HYDROCHLORIDE 10 MG: 10 TABLET, FILM COATED ORAL at 09:56

## 2019-05-13 RX ADMIN — OXYCODONE HYDROCHLORIDE 5 MG: 5 TABLET ORAL at 20:26

## 2019-05-13 RX ADMIN — OXYCODONE HYDROCHLORIDE 5 MG: 5 TABLET ORAL at 13:13

## 2019-05-13 RX ADMIN — CYCLOBENZAPRINE HYDROCHLORIDE 10 MG: 10 TABLET, FILM COATED ORAL at 16:41

## 2019-05-13 RX ADMIN — IBUPROFEN 800 MG: 400 TABLET ORAL at 13:13

## 2019-05-13 RX ADMIN — SENNOSIDES AND DOCUSATE SODIUM 1 TABLET: 8.6; 5 TABLET ORAL at 07:41

## 2019-05-13 RX ADMIN — ACETAMINOPHEN 650 MG: 325 TABLET, FILM COATED ORAL at 09:56

## 2019-05-13 RX ADMIN — HYDROCORTISONE: 25 CREAM TOPICAL at 07:45

## 2019-05-13 NOTE — PLAN OF CARE
Patient taking Tylenol, Ibuprofen, Oxycodone, and Flexeril for pain, patient states pain is improving. Patient ambulating independently, voiding without difficulty. Patient using Hydrocortisone cream for hemorrhoids. Patient independent with self cares, bottle feeding infant. Encouraged patient to call with needs/questions. Call light within reach, will continue to monitor.

## 2019-05-13 NOTE — PLAN OF CARE
VSS. Voiding adequately on own. Scant vag bleeding. Pain controlled w/ flexeril, ibuprofen, and tylenol. Using ice, tucks, and heating pad for comfort. Bottle feeding baby.

## 2019-05-13 NOTE — ANESTHESIA POSTPROCEDURE EVALUATION
Patient: Milena Brumfield    * No procedures listed *    Diagnosis:* No pre-op diagnosis entered *  Diagnosis Additional Information: No value filed.    Anesthesia Type:  No value filed.    Note:  Anesthesia Post Evaluation    Patient location during evaluation: Bedside  Patient participation: Able to fully participate in evaluation  Level of consciousness: awake and alert  Pain management: adequate  Airway patency: patent  Cardiovascular status: acceptable and hemodynamically stable  Respiratory status: acceptable and unassisted  Hydration status: acceptable  PONV: none     Anesthetic complications: None    Comments: Patient doing well, ambulating without difficulty, denies any HA, paresthesias or complications associated with the epidural.          Last vitals:  Vitals:    05/12/19 2305 05/13/19 0004 05/13/19 0740   BP:  119/73 111/72   Pulse:  67 83   Resp: 16 18 16   Temp:  36.6  C (97.9  F) 36.7  C (98  F)   SpO2:            Electronically Signed By: Edgardo Wilson MD  May 13, 2019  4:23 PM

## 2019-05-13 NOTE — PROVIDER NOTIFICATION
05/13/19 0004   Provider Notification   Provider Name/Title Dr. Keene   Method of Notification Phone   Request Evaluate-Remote   Notification Reason Other  (Pain not controlled)     Orders received for flexeril and oxycodone. See MAR.

## 2019-05-13 NOTE — PROGRESS NOTES
UPDATE:  RN on postpartum called me and states pt with worsening lower back and uterine pain despite motrin and tylenol as scheduled.  Pt with h/o chronic back pain and drug abuse.    Plan: 1. Will try flexeril 10 mg po TID and if no relief will do oxycodone 5 mg po every 6 hours as needed but will NOT give script to go home with as vaginal delivery.    Monica Keene M.D.  May 13, 2019   12:17 AM

## 2019-05-13 NOTE — PROGRESS NOTES
OB Vaginal Delivery Progress Note    Patient name: Milena Brumfield  : 1995  Admit date: 2019  MRN: [id number]  Acct: [id number]      Assessment/Plan:  Milena Brumfield is a  who is PPD#1 from .    - HD stable  - Cramping pains: pt notes inadequate pain control w/ ibuprofen, tylenol, flexeril. States a little better after Percocet but still largely inadequate. Pt w/ hx of drug abuse so trying to avoid narcotics. Pt notes will try hot bath next. Agree w/ this plan.  - Bipolar d/o: pt notes moods good/stable this AM, got a good night sleep  - Blood type: AB pos, no need for Rhogam  - VMI; bottle feeding  - Cont routine PP care. Anticipate d/c home tomorrow       Subjective:  The patient did well overnight. There were no acute issues other than pain control. Her cramping pain is not well controlled per her report even w/ narcotics added (see plan above). She notes appropriate lochia. She is tolerating a regular diet well without nausea or vomiting. She has ambulated. She is voiding without difficulty. She denies dizziness, lightheadedness, headache, vision changes, chest pain, shortness of breath, RUQ pain, calf pain, or other complaints on ROS.    Objective:  Vitals:  Temp:  [97.8  F (36.6  C)-98.7  F (37.1  C)] 97.9  F (36.6  C)  Pulse:  [67-79] 67  Resp:  [16-18] 18  BP: ()/(52-78) 119/73  SpO2:  [93 %-99 %] 98 %    Exam:  General: no acute distress  Cardiovascular: pulses intact, no peripheral edema  Pulmonary: no respiratory difficulty, normal non-labored breathing  Abdomen: soft, appropriatly tender to palpation, non-distended  Uterus: fundus firm at U   Extremities: BL lower extremities non-tender, no palpable cords  Psych: mood appropriate      Lobo Goss MD  2019, 7:37 AM

## 2019-05-13 NOTE — PROGRESS NOTES
SW:    D: SW attempted to see pt to complete consult. Pt requested SW to come back at another time.  P: SW to follow & attempt again.    MORIS Black

## 2019-05-14 VITALS
BODY MASS INDEX: 37.05 KG/M2 | HEIGHT: 64 IN | DIASTOLIC BLOOD PRESSURE: 74 MMHG | SYSTOLIC BLOOD PRESSURE: 116 MMHG | HEART RATE: 77 BPM | WEIGHT: 217 LBS | RESPIRATION RATE: 16 BRPM | OXYGEN SATURATION: 98 % | TEMPERATURE: 97.5 F

## 2019-05-14 PROCEDURE — 25000132 ZZH RX MED GY IP 250 OP 250 PS 637: Performed by: OBSTETRICS & GYNECOLOGY

## 2019-05-14 RX ADMIN — IBUPROFEN 800 MG: 400 TABLET ORAL at 00:21

## 2019-05-14 RX ADMIN — ACETAMINOPHEN 650 MG: 325 TABLET, FILM COATED ORAL at 00:21

## 2019-05-14 RX ADMIN — OXYCODONE HYDROCHLORIDE 5 MG: 5 TABLET ORAL at 07:07

## 2019-05-14 RX ADMIN — IBUPROFEN 800 MG: 400 TABLET ORAL at 08:22

## 2019-05-14 NOTE — PLAN OF CARE
Patient taking Ibuprofen and Oxycodone for pain with adequate pain relief. Patient states pain is much better today. Patient ready for discharge, all education complete. Patient independent with self and infant cares.  saw patient today, states patient has good support system. Encouraged patient to call with needs/questions. Call light within reach, will continue to monitor until discharge.

## 2019-05-14 NOTE — PLAN OF CARE
Patient discharged home with significant other er orders. Discharge instructions reviewed with patient, patient states understanding, paperwork signed. Save your life information sheet given. No further questions or concerns.

## 2019-05-14 NOTE — DISCHARGE INSTRUCTIONS

## 2019-05-14 NOTE — PROGRESS NOTES
"Milena M Octaviano  May 14, 2019    S: pt doing well.  Pain well controlled,  Ambulating without difficulty.  Tolerating po intake.  Decreased lochia.  Bottle feeding.    O: /68   Pulse 83   Temp 99.2  F (37.3  C) (Oral)   Resp 16   Ht 1.626 m (5' 4\")   Wt 98.4 kg (217 lb)   LMP  (LMP Unknown)   SpO2 98%   Breastfeeding? Unknown   BMI 37.25 kg/m    Recent Labs   Lab 19  2247   HGB 11.5*     Abdomen: soft, non tender, fundus firm below the umbilicus  Ext: non tender, no edema or erythema    A/P: s/p  PPD #2  Bipolar- mood stable.  F/u post partum  Drug use- SW following  Doing well  Continue routine post partum care  Discharge planning for today    Barby Willams  "

## 2019-05-14 NOTE — PROGRESS NOTES
Care Transition Initial Assessment - SW     Met with: Patient (mom and baby)  Active Problems:    Liveborn infant       DATA    Description of Support System: Supportive, Involved  Who is your support system?: Parent(s), Sibling(s), Friends  Support Assessment: Adequate social supports.   Identified issues/concerns regarding health management: None.  SW checked with nurse and pt drug screen is negative.  Nurse states pt is doing well and bonding and caring well for baby.         ASSESSMENT  Cognitive Status:  Alert and oriented  Concerns to be addressed: Discussed post-partum mood disorders, and that pt may be at increased risk due to previous bi-polar symptoms.  Encouraged pt to reach out to her OB, PCP, or to call PPSM (Post-Partum Support [place]) if she has concerns or needs additional support.  Talked to pt about the services available at [facility] if she chooses to reach out to them.  Gave pt a folder with resources, and gave pt bag of diapers.  Pt is interacting lovingly with baby, and spoke eagerly about him and her 5 year old daughter.  Pt will be going back to work at her part-time job in about 6 weeks, and has childcare arrangements made.     PLAN  Pt is discharging home with baby.  Pt mother has custody of pt 5 year old daughter, and pt sees her daughter frequently.  Pt is working with CPS to regain custody of her 5 year old.  Pt states she has a county worker, and she declined any assistance from SW to communicate with them.

## 2019-05-14 NOTE — PLAN OF CARE
Vital signs stable. Patient voiding without difficulty. Able to ambulate in room free of dizziness. Taking tylenol/ibuprofen/oxy for pain management. Using ice packs, heat packs and tucks for comfort.  Encouraged to call with questions/concerns. Will continue to monitor.

## 2019-10-03 ENCOUNTER — HEALTH MAINTENANCE LETTER (OUTPATIENT)
Age: 24
End: 2019-10-03

## 2021-01-15 ENCOUNTER — HEALTH MAINTENANCE LETTER (OUTPATIENT)
Age: 26
End: 2021-01-15

## 2021-09-05 ENCOUNTER — HEALTH MAINTENANCE LETTER (OUTPATIENT)
Age: 26
End: 2021-09-05

## 2022-01-04 ENCOUNTER — HOSPITAL ENCOUNTER (EMERGENCY)
Facility: CLINIC | Age: 27
Discharge: HOME | End: 2022-01-04
Attending: EMERGENCY MEDICINE | Admitting: EMERGENCY MEDICINE
Payer: COMMERCIAL

## 2022-01-04 VITALS
WEIGHT: 192 LBS | HEART RATE: 76 BPM | DIASTOLIC BLOOD PRESSURE: 79 MMHG | TEMPERATURE: 98.2 F | SYSTOLIC BLOOD PRESSURE: 120 MMHG | HEIGHT: 64 IN | BODY MASS INDEX: 32.78 KG/M2 | RESPIRATION RATE: 18 BRPM | OXYGEN SATURATION: 97 %

## 2022-01-04 DIAGNOSIS — L50.9 HIVES: ICD-10-CM

## 2022-01-04 DIAGNOSIS — B33.8 RSV INFECTION: ICD-10-CM

## 2022-01-04 LAB
ALBUMIN SERPL-MCNC: 3.5 G/DL (ref 3.4–5)
ALP SERPL-CCNC: 78 U/L (ref 40–150)
ALT SERPL W P-5'-P-CCNC: 23 U/L (ref 0–50)
ANION GAP SERPL CALCULATED.3IONS-SCNC: 5 MMOL/L (ref 3–14)
AST SERPL W P-5'-P-CCNC: 12 U/L (ref 0–45)
BASOPHILS # BLD AUTO: 0 10E3/UL (ref 0–0.2)
BASOPHILS NFR BLD AUTO: 0 %
BILIRUB SERPL-MCNC: 0.4 MG/DL (ref 0.2–1.3)
BUN SERPL-MCNC: 9 MG/DL (ref 7–30)
CALCIUM SERPL-MCNC: 8.9 MG/DL (ref 8.5–10.1)
CHLORIDE BLD-SCNC: 105 MMOL/L (ref 94–109)
CO2 SERPL-SCNC: 24 MMOL/L (ref 20–32)
CREAT SERPL-MCNC: 0.6 MG/DL (ref 0.52–1.04)
EOSINOPHIL # BLD AUTO: 0 10E3/UL (ref 0–0.7)
EOSINOPHIL NFR BLD AUTO: 0 %
ERYTHROCYTE [DISTWIDTH] IN BLOOD BY AUTOMATED COUNT: 11.9 % (ref 10–15)
FLUAV RNA SPEC QL NAA+PROBE: NEGATIVE
FLUBV RNA RESP QL NAA+PROBE: NEGATIVE
GFR SERPL CREATININE-BSD FRML MDRD: >90 ML/MIN/1.73M2
GLUCOSE BLD-MCNC: 105 MG/DL (ref 70–99)
HCT VFR BLD AUTO: 48.5 % (ref 35–47)
HGB BLD-MCNC: 16.3 G/DL (ref 11.7–15.7)
HOLD SPECIMEN: NORMAL
IMM GRANULOCYTES # BLD: 0 10E3/UL
IMM GRANULOCYTES NFR BLD: 0 %
LYMPHOCYTES # BLD AUTO: 2.6 10E3/UL (ref 0.8–5.3)
LYMPHOCYTES NFR BLD AUTO: 25 %
MCH RBC QN AUTO: 31.3 PG (ref 26.5–33)
MCHC RBC AUTO-ENTMCNC: 33.6 G/DL (ref 31.5–36.5)
MCV RBC AUTO: 93 FL (ref 78–100)
MONOCYTES # BLD AUTO: 0.4 10E3/UL (ref 0–1.3)
MONOCYTES NFR BLD AUTO: 4 %
NEUTROPHILS # BLD AUTO: 7 10E3/UL (ref 1.6–8.3)
NEUTROPHILS NFR BLD AUTO: 71 %
NRBC # BLD AUTO: 0 10E3/UL
NRBC BLD AUTO-RTO: 0 /100
PLATELET # BLD AUTO: 304 10E3/UL (ref 150–450)
POTASSIUM BLD-SCNC: 4.2 MMOL/L (ref 3.4–5.3)
PROT SERPL-MCNC: 7.3 G/DL (ref 6.8–8.8)
RBC # BLD AUTO: 5.21 10E6/UL (ref 3.8–5.2)
RSV RNA SPEC NAA+PROBE: POSITIVE
SARS-COV-2 RNA RESP QL NAA+PROBE: NEGATIVE
SODIUM SERPL-SCNC: 134 MMOL/L (ref 133–144)
WBC # BLD AUTO: 10.1 10E3/UL (ref 4–11)

## 2022-01-04 PROCEDURE — 80053 COMPREHEN METABOLIC PANEL: CPT | Performed by: EMERGENCY MEDICINE

## 2022-01-04 PROCEDURE — 82040 ASSAY OF SERUM ALBUMIN: CPT | Performed by: EMERGENCY MEDICINE

## 2022-01-04 PROCEDURE — 99284 EMERGENCY DEPT VISIT MOD MDM: CPT | Mod: 25

## 2022-01-04 PROCEDURE — 96361 HYDRATE IV INFUSION ADD-ON: CPT

## 2022-01-04 PROCEDURE — 250N000009 HC RX 250: Performed by: EMERGENCY MEDICINE

## 2022-01-04 PROCEDURE — 258N000003 HC RX IP 258 OP 636: Performed by: EMERGENCY MEDICINE

## 2022-01-04 PROCEDURE — 85025 COMPLETE CBC W/AUTO DIFF WBC: CPT | Performed by: EMERGENCY MEDICINE

## 2022-01-04 PROCEDURE — 96375 TX/PRO/DX INJ NEW DRUG ADDON: CPT

## 2022-01-04 PROCEDURE — C9803 HOPD COVID-19 SPEC COLLECT: HCPCS

## 2022-01-04 PROCEDURE — 96374 THER/PROPH/DIAG INJ IV PUSH: CPT

## 2022-01-04 PROCEDURE — 87637 SARSCOV2&INF A&B&RSV AMP PRB: CPT | Performed by: EMERGENCY MEDICINE

## 2022-01-04 PROCEDURE — 250N000011 HC RX IP 250 OP 636: Performed by: EMERGENCY MEDICINE

## 2022-01-04 PROCEDURE — 36415 COLL VENOUS BLD VENIPUNCTURE: CPT | Performed by: EMERGENCY MEDICINE

## 2022-01-04 RX ORDER — EPINEPHRINE 0.3 MG/.3ML
0.3 INJECTION SUBCUTANEOUS
Qty: 2 EACH | Refills: 3 | Status: SHIPPED | OUTPATIENT
Start: 2022-01-04

## 2022-01-04 RX ORDER — DIPHENHYDRAMINE HYDROCHLORIDE 50 MG/ML
50 INJECTION INTRAMUSCULAR; INTRAVENOUS ONCE
Status: COMPLETED | OUTPATIENT
Start: 2022-01-04 | End: 2022-01-04

## 2022-01-04 RX ORDER — LIDOCAINE 40 MG/G
CREAM TOPICAL
Status: DISCONTINUED | OUTPATIENT
Start: 2022-01-04 | End: 2022-01-04 | Stop reason: HOSPADM

## 2022-01-04 RX ORDER — METHYLPREDNISOLONE SODIUM SUCCINATE 125 MG/2ML
125 INJECTION, POWDER, LYOPHILIZED, FOR SOLUTION INTRAMUSCULAR; INTRAVENOUS ONCE
Status: COMPLETED | OUTPATIENT
Start: 2022-01-04 | End: 2022-01-04

## 2022-01-04 RX ORDER — PREDNISONE 20 MG/1
40 TABLET ORAL DAILY
Qty: 8 TABLET | Refills: 0 | Status: SHIPPED | OUTPATIENT
Start: 2022-01-04 | End: 2022-01-08

## 2022-01-04 RX ADMIN — LIDOCAINE: 40 CREAM TOPICAL at 15:46

## 2022-01-04 RX ADMIN — EPINEPHRINE 0.5 MG: 1 INJECTION INTRAMUSCULAR; INTRAVENOUS; SUBCUTANEOUS at 15:28

## 2022-01-04 RX ADMIN — FAMOTIDINE 20 MG: 10 INJECTION, SOLUTION INTRAVENOUS at 15:34

## 2022-01-04 RX ADMIN — METHYLPREDNISOLONE SODIUM SUCCINATE 125 MG: 125 INJECTION, POWDER, FOR SOLUTION INTRAMUSCULAR; INTRAVENOUS at 15:35

## 2022-01-04 RX ADMIN — SODIUM CHLORIDE 1000 ML: 9 INJECTION, SOLUTION INTRAVENOUS at 15:33

## 2022-01-04 RX ADMIN — DIPHENHYDRAMINE HYDROCHLORIDE 50 MG: 50 INJECTION INTRAMUSCULAR; INTRAVENOUS at 15:33

## 2022-01-04 ASSESSMENT — ENCOUNTER SYMPTOMS
SORE THROAT: 0
FACIAL SWELLING: 1
COUGH: 1

## 2022-01-04 ASSESSMENT — MIFFLIN-ST. JEOR: SCORE: 1590.91

## 2022-01-04 NOTE — LETTER
January 4, 2022      To Whom It May Concern:      Milena Brumfield was seen in our Emergency Department today, 01/04/22.  I expect her condition to improve over the next few days.  She may return to work/school when improved.    Sincerely,        Aury BARBOSA RN

## 2022-01-04 NOTE — ED TRIAGE NOTES
Pt presents to the ER with c/o rash all over, body aches, cough, congestion, Tested negative for Covid after an exposure on Wed. Wants to be retested.

## 2022-01-04 NOTE — ED PROVIDER NOTES
"  History   Chief Complaint:  Covid Concern and Rash     The history is provided by the patient and medical records.      Milena Brumfield is a 26 year old female with history of bipolar affective disorder who presents with hives. The patient states she was exposed by a friend who tested positive for COVID last Monday one week ago. On Wednesday, she went to urgent care due to concern for strep. She was swabbed and informed of a negative COVID test on Friday. Over the last 24 hours, she has developed hives all over her body including her arms, chest, and abdomen. She also has slight swelling of the upper lip. She endorses cough and congestion. She denies sore throat.     Review of Systems   HENT: Positive for congestion and facial swelling (upper lip). Negative for sore throat.    Respiratory: Positive for cough.    Skin: Positive for rash.   All other systems reviewed and are negative.    Allergies:  Ceftin  Cefuroxime  Hydrocodone-Acetaminophen    Medications:  Seroquel     Past Medical History:     Anemia  Bipolar affective disorder  Enlarged thyroid     Marijuana abuse     Past Surgical History:    [place] teeth extraction      Family History:    Mother - Depression     Social History:  The patient was unaccompanied to the emergency department.    Physical Exam     Patient Vitals for the past 24 hrs:   BP Temp Temp src Pulse Resp SpO2 Height Weight   01/04/22 1900 118/71 -- -- -- -- 99 % -- --   01/04/22 1730 120/87 -- -- 81 -- 97 % -- --   01/04/22 1700 125/74 -- -- 81 -- 100 % -- --   01/04/22 1630 137/75 -- -- 78 -- 100 % -- --   01/04/22 1600 139/80 -- -- 80 -- 97 % -- --   01/04/22 1500 126/81 -- -- 102 -- 97 % -- --   01/04/22 1458 132/86 98.2  F (36.8  C) Oral 93 18 97 % 1.626 m (5' 4\") 87.1 kg (192 lb)     Physical Exam  Physical Exam   Nursing note and vitals reviewed.  General: Oriented to person, place, and time. Appears well-developed and well-nourished.   Head: No signs of trauma.   Mouth/Throat: " Oropharynx is clear and moist.   Eyes: Conjunctivae are normal. Pupils are equal, round, and reactive to light.   Neck: Normal range of motion. No nuchal rigidity.   Cardiovascular: Normal rate and regular rhythm.    Respiratory: Effort normal and breath sounds normal. No respiratory distress.   Abdominal: Soft. There is no tenderness. There is no guarding.   Musculoskeletal: Normal range of motion. no edema.   Neurological: The patient is alert and oriented to person, place, and time.  PERRLA, EOMI, visual fields intact, strength in upper/lower extremities normal and symmetrical.   Sensation normal. Speech normal  GCS eye subscore is 4. GCS verbal subscore is 5. GCS motor subscore is 6.   Skin: Skin is warm and dry. Small 5 mm circular hives scattered over her torso and extremities.   Psychiatric: normal mood and affect. behavior is normal.     Emergency Department Course   Laboratory:  Labs Ordered and Resulted from Time of ED Arrival to Time of ED Departure   COMPREHENSIVE METABOLIC PANEL - Abnormal       Result Value    Sodium 134      Potassium 4.2      Chloride 105      Carbon Dioxide (CO2) 24      Anion Gap 5      Urea Nitrogen 9      Creatinine 0.60      Calcium 8.9      Glucose 105 (*)     Alkaline Phosphatase 78      AST 12      ALT 23      Protein Total 7.3      Albumin 3.5      Bilirubin Total 0.4      GFR Estimate >90     CBC WITH PLATELETS AND DIFFERENTIAL - Abnormal    WBC Count 10.1      RBC Count 5.21 (*)     Hemoglobin 16.3 (*)     Hematocrit 48.5 (*)     MCV 93      MCH 31.3      MCHC 33.6      RDW 11.9      Platelet Count 304      % Neutrophils 71      % Lymphocytes 25      % Monocytes 4      % Eosinophils 0      % Basophils 0      % Immature Granulocytes 0      NRBCs per 100 WBC 0      Absolute Neutrophils 7.0      Absolute Lymphocytes 2.6      Absolute Monocytes 0.4      Absolute Eosinophils 0.0      Absolute Basophils 0.0      Absolute Immature Granulocytes 0.0      Absolute NRBCs 0.0      INFLUENZA A/B & SARS-COV2 PCR MULTIPLEX - Abnormal    Influenza A PCR Negative      Influenza B PCR Negative      RSV PCR Positive (*)     SARS CoV2 PCR Negative        Emergency Department Course:  Reviewed:  I reviewed nursing notes, vitals, past medical history and Care Everywhere    Assessments:  1514 I obtained history and examined the patient as noted above.   1657 I rechecked the patient and explained findings. She states she feels improved after interventions below.     Interventions:  1528 Adrenaline 0.5 mg IM  1533 0.9% NaCl bolus 1000 mL IV  1533 Benadryl 50 mg IV  1534 Pepcid 20 mg IV  1535 Solu Medrol 125 mg IV  1546 Lidocaine cream topical     Disposition:  The patient was discharged to home.     Impression & Plan   Medical Decision Making:  Milena Brumfield is a 26 year old female who presents for evaluation of rash.  This is likely consistent with allergic phenomena.  This appears to be at this point an isolated rash without signs of angioedema, respiratory compromise, shock, serious systemic reaction, etc.  she looks overall  well here initially and after observation with detailed physical exam and history to look for a more serious allergic reaction/anaphylaxis.   Complete resolution of hives after the above treatment. No signs of serious infection, cellulitis, vasculitis, or other skin manifestation of a serious underlying disease. Additionally, due to close COVID contact, the patient was swabbed. RSV returned positive.  This may have something to do with her hives the correlation is unclear.  Supportive outpatient management is indicated, medications for discharge noted above.  Close followup with primary care physician.  Return if  wheezing, progressive shortness of breath, facial or throat/tongue swelling.     Diagnosis:    ICD-10-CM    1. RSV infection  B97.4    2. Hives  L50.9      Discharge Medications:  New Prescriptions    EPINEPHRINE (ANY BX GENERIC EQUIV) 0.3 MG/0.3ML INJECTION 2-PACK     Inject 0.3 mLs (0.3 mg) into the muscle once as needed    PREDNISONE (DELTASONE) 20 MG TABLET    Take 2 tablets (40 mg) by mouth daily for 4 days     Scribe Disclosure:  I, Shivani Alaniz, am serving as a scribe at 3:13 PM on 1/4/2022 to document services personally performed by Edwin Andrews MD based on my observations and the provider's statements to me.     Edwin Andrews MD  01/04/22 [id number]

## 2022-02-20 ENCOUNTER — HEALTH MAINTENANCE LETTER (OUTPATIENT)
Age: 27
End: 2022-02-20

## 2022-08-02 ENCOUNTER — LAB REQUISITION (OUTPATIENT)
Dept: LAB | Facility: CLINIC | Age: 27
End: 2022-08-02

## 2022-08-02 PROCEDURE — 86481 TB AG RESPONSE T-CELL SUSP: CPT | Performed by: INTERNAL MEDICINE

## 2022-08-02 PROCEDURE — 86787 VARICELLA-ZOSTER ANTIBODY: CPT | Performed by: INTERNAL MEDICINE

## 2022-08-03 LAB
VZV IGG SER QL IA: 213.3 INDEX
VZV IGG SER QL IA: POSITIVE

## 2022-08-04 LAB
GAMMA INTERFERON BACKGROUND BLD IA-ACNC: 0.07 IU/ML
M TB IFN-G BLD-IMP: NEGATIVE
M TB IFN-G CD4+ BCKGRND COR BLD-ACNC: 9.93 IU/ML
MITOGEN IGNF BCKGRD COR BLD-ACNC: -0.01 IU/ML
MITOGEN IGNF BCKGRD COR BLD-ACNC: 0 IU/ML
QUANTIFERON MITOGEN: 10 IU/ML
QUANTIFERON NIL TUBE: 0.07 IU/ML
QUANTIFERON TB1 TUBE: 0.06 IU/ML
QUANTIFERON TB2 TUBE: 0.07

## 2022-10-23 ENCOUNTER — HEALTH MAINTENANCE LETTER (OUTPATIENT)
Age: 27
End: 2022-10-23

## 2023-04-02 ENCOUNTER — HEALTH MAINTENANCE LETTER (OUTPATIENT)
Age: 28
End: 2023-04-02

## 2024-06-02 ENCOUNTER — HEALTH MAINTENANCE LETTER (OUTPATIENT)
Age: 29
End: 2024-06-02

## 2024-09-30 ENCOUNTER — HOSPITAL ENCOUNTER (EMERGENCY)
Facility: CLINIC | Age: 29
Discharge: HOME | End: 2024-09-30
Attending: EMERGENCY MEDICINE | Admitting: EMERGENCY MEDICINE

## 2024-09-30 ENCOUNTER — APPOINTMENT (OUTPATIENT)
Dept: GENERAL RADIOLOGY | Facility: CLINIC | Age: 29
End: 2024-09-30
Attending: EMERGENCY MEDICINE

## 2024-09-30 VITALS
RESPIRATION RATE: 18 BRPM | HEART RATE: 83 BPM | WEIGHT: 200 LBS | BODY MASS INDEX: 34.15 KG/M2 | DIASTOLIC BLOOD PRESSURE: 83 MMHG | OXYGEN SATURATION: 99 % | HEIGHT: 64 IN | SYSTOLIC BLOOD PRESSURE: 124 MMHG | TEMPERATURE: 98 F

## 2024-09-30 DIAGNOSIS — N39.0 URINARY TRACT INFECTION WITHOUT HEMATURIA, SITE UNSPECIFIED: ICD-10-CM

## 2024-09-30 DIAGNOSIS — H92.02 OTALGIA, LEFT: ICD-10-CM

## 2024-09-30 LAB
ALBUMIN UR-MCNC: NEGATIVE MG/DL
APPEARANCE UR: CLEAR
BILIRUB UR QL STRIP: NEGATIVE
COLOR UR AUTO: ABNORMAL
GLUCOSE UR STRIP-MCNC: NEGATIVE MG/DL
HCG UR QL: NEGATIVE
HGB UR QL STRIP: NEGATIVE
KETONES UR STRIP-MCNC: NEGATIVE MG/DL
LEUKOCYTE ESTERASE UR QL STRIP: ABNORMAL
MUCOUS THREADS #/AREA URNS LPF: PRESENT /LPF
NITRATE UR QL: NEGATIVE
PH UR STRIP: 6 [PH] (ref 5–7)
RBC URINE: 1 /HPF
SP GR UR STRIP: 1.02 (ref 1–1.03)
SQUAMOUS EPITHELIAL: <1 /HPF
UROBILINOGEN UR STRIP-MCNC: NORMAL MG/DL
WBC URINE: 31 /HPF

## 2024-09-30 PROCEDURE — 99285 EMERGENCY DEPT VISIT HI MDM: CPT | Mod: 25

## 2024-09-30 PROCEDURE — 81001 URINALYSIS AUTO W/SCOPE: CPT | Performed by: EMERGENCY MEDICINE

## 2024-09-30 PROCEDURE — 87186 SC STD MICRODIL/AGAR DIL: CPT | Performed by: EMERGENCY MEDICINE

## 2024-09-30 PROCEDURE — 81025 URINE PREGNANCY TEST: CPT | Performed by: EMERGENCY MEDICINE

## 2024-09-30 PROCEDURE — 93005 ELECTROCARDIOGRAM TRACING: CPT

## 2024-09-30 PROCEDURE — 71046 X-RAY EXAM CHEST 2 VIEWS: CPT

## 2024-09-30 RX ORDER — SULFAMETHOXAZOLE/TRIMETHOPRIM 800-160 MG
1 TABLET ORAL 2 TIMES DAILY
Qty: 14 TABLET | Refills: 0 | Status: SHIPPED | OUTPATIENT
Start: 2024-09-30 | End: 2024-10-07

## 2024-09-30 ASSESSMENT — ACTIVITIES OF DAILY LIVING (ADL): ADLS_ACUITY_SCORE: 35

## 2024-09-30 NOTE — ED TRIAGE NOTES
Pt reports having left ear pain with bloody drainage for about 1-2 weeks, and on and off migraine since then.   Starting at 4AM she started having left sided chest pain, more painful when breathing in. Also in triage she reports having UTI symptoms, that when she pees she can feel the pain shoot up to her chest.

## 2024-09-30 NOTE — ED PROVIDER NOTES
"  Emergency Department Note      History of Present Illness     Chief Complaint   Otalgia, Chest Pain, and Headache    HPI   Milena Brumfield is a 29 year old female presenting with otalgia, chest pain and headache. The patient notes left sided ear pain with draining for the past 1-2 weeks. She reports waking up one morning and noticing dried blood in the ear. She has been managing the pain with ibuprofen, with partial relief. Since onset, Milena reports intermittent headaches. The patient notes chest pain that onset at 0400 this morning that is painful to palpation and with deep breathing. The patient otherwise reports pain with urination and frequency. She denies vaginal discharge, but endorses vaginal spotting. She notes having an IUD in place for the past 5 years. Milena endorses mild cough with no sore throat.     Independent Historian   None    Review of External Notes   None    Past Medical History     Medical History and Problem List   Anemia  Anxiety   Bipolar affective disorder   Chlamydia   Marijuana use   Smoker  Substance abuse     Medications   Epinephrine   Metformin   Quetiapine    Surgical History   [place] teeth extraction     Physical Exam     Patient Vitals for the past 24 hrs:   BP Temp Pulse Resp SpO2 Height Weight   09/30/24 1041 -- -- -- 18 -- -- --   09/30/24 1013 124/83 98  F (36.7  C) 83 -- 99 % 1.626 m (5' 4\") 90.7 kg (200 lb)     Physical Exam  General:  Sitting on bed.  HENT:  No obvious trauma to head. Posterior oropharynx without erythema, uvula is midline and no soft palate petechiae. No swelling beneath tongue.  Right Ear:  External ear normal. Tympanic membrane is without erythema or bulging.  Left Ear:  External ear normal. Tympanic membrane is without erythema or bulging.  Nose:  Nose normal.   Eyes:  Conjunctivae and EOM are normal. Pupils are equal, round, and reactive.   Neck: Normal range of motion. Neck supple. No tracheal deviation present.   CV:  Normal heart sounds. No murmur " heard.  Pulm/Chest: Effort normal and breath sounds normal.   Abd: Soft. No distension. There is no tenderness. There is no rigidity, no rebound and no guarding.   M/S: Normal range of motion.   Neuro: Awake and alert.   Skin: Skin is warm and dry. No rash noted. Not diaphoretic.   Psych: Normal mood and affect. Behavior is normal.      Diagnostics     Lab Results   Labs Ordered and Resulted from Time of ED Arrival to Time of ED Departure   ROUTINE UA WITH MICROSCOPIC REFLEX TO CULTURE - Abnormal       Result Value    Color Urine Light Yellow      Appearance Urine Clear      Glucose Urine Negative      Bilirubin Urine Negative      Ketones Urine Negative      Specific Gravity Urine 1.023      Blood Urine Negative      pH Urine 6.0      Protein Albumin Urine Negative      Urobilinogen Urine Normal      Nitrite Urine Negative      Leukocyte Esterase Urine Trace (*)     Mucus Urine Present (*)     RBC Urine 1      WBC Urine 31 (*)     Squamous Epithelials Urine <1     HCG QUALITATIVE URINE - Normal    hCG Urine Qualitative Negative     URINE CULTURE     Imaging   XR Chest 2 Views   Final Result   IMPRESSION: There are no acute infiltrates. The cardiac silhouette is   not enlarged. Pulmonary vasculature is unremarkable.      DAVID RAMESH MD            SYSTEM ID:  [id number]        EKG   ECG taken at 1052, ECG read at 1057  Normal sinus rhythm   Nonspecific T wave abnormality   Abnormal ECG   Rate 81 bpm. NC interval 160 ms. QRS duration 82 ms. QT/QTc 386/448 ms. P-R-T axes 34 18 8.    Independent Interpretation   I independently interpreted the chest XR, no evidence of infiltrate or pneumothorax.     ED Course      Medications Administered   Medications - No data to display    Procedures   None     Discussion of Management   None    ED Course   ED Course as of 09/30/24 1227   Mon Sep 30, 2024   1111 I obtained the history and examined the patient as noted above.      1227 Patient had been discharged prior to me being  able to review things with her.  I called her on the phone.  Discussed her results and plan of care.  She is in agreement.     Additional Documentation  None    Medical Decision Making / Diagnosis     CMS Diagnoses:   None    MIPS       None    MDM   Milena Brumfield is a very pleasant 29 year old female has symptoms consistent with a urinary tract infection.  Urinalysis confirms the infection.  There has been no fever, significant back/flank pain or significant abdominal pain.  There is no clinical evidence of pyelonephritis, appendicitis,  or diverticulitis.  The patient will be started on antibiotics for the infection. The patient is instructed to return if increasing pain, vomiting, fever, or inability to tolerate the oral antibiotic.  The patient additionally complains of URI symptoms. There is no signs at this point of serious bacterial infection such as OM, RPA, epiglottitis, PTA, strep pharyngitis, pneumonia, sinusitis, meningitis, bacteremia.  She mentions some discharge to the left ear that appeared to be a bloody drainage.  TM is normal.  No evidence of otitis media.  Ear canals also normal.  No evidence of otitis externa.  There is a scant amount of wax in the ear canal which may have caused this appearance of discharge.  No indication for otic drops at this time.  She mention chest discomfort as well.  EKG unremarkable for any acute ST changes.   The chest xray was reviewed and shows no evidence of pneumothorax, infiltrate to suggest pneumonia, widened mediastinum to suggest aortic dissection, obvious rib fracture or free air under the diaphragm to suggest perforated viscous ulcer. The patient inferent imaging that is unremarkable. Antibiotics prescribed as above for her UTI. Follow up with primary physician is indicated if not improving in 2-3 days.     The treatment plan was discussed with the patient and they expressed understanding of this plan and consented to the plan.  In addition, the patient will  return to the emergency department if their symptoms persist, worsen, if new symptoms arise or if there is any concern as other pathology may be present that is not evident at this time. They also understand the importance of close follow up in the clinic and if unable to do so will return to the emergency department for a reevaluation. All questions were answered.    Disposition   The patient was discharged.     Diagnosis     ICD-10-CM    1. Urinary tract infection without hematuria, site unspecified  N39.0       2. Otalgia, left  H92.02          Discharge Medications   Discharge Medication List as of 9/30/2024 11:59 AM        START taking these medications    Details   sulfamethoxazole-trimethoprim (BACTRIM DS) 800-160 MG tablet Take 1 tablet by mouth 2 times daily for 7 days., Disp-14 tablet, R-0, E-Prescribe           Scribe Disclosure:  Melonie WHITLEY, am serving as a scribe at 10:41 AM on 9/30/2024 to document services personally performed by Terence Pinto DO based on my observations and the provider's statements to me.        Terence Pinto DO  09/30/24 [id number]

## 2024-09-30 NOTE — DISCHARGE INSTRUCTIONS
Discharge Instructions  Urinary Tract Infection  You or your child have been diagnosed with a urinary tract infection, or UTI. The urinary tract includes the kidneys (which make urine/pee), ureters (the tubes that carry urine/pee from the kidneys to the bladder), the bladder (which stores urine/pee), and urethra (the tube that carries urine/pee out of the bladder). Urinary tract infections occur when bacteria travel up the urethra into the bladder (bladder infection) and, in some cases, from there into the kidneys (kidney infection).  Generally, every Emergency Department visit should have a follow-up clinic visit with either a primary or a specialty clinic/provider. Please follow-up as instructed by your emergency provider today.  Return to the Emergency Department if:  You or your child have severe back pain.  You or your child are vomiting (throwing up) so that you cannot take your medicine.  You or your child have a new fever (had not previously had a fever) over 101 F.  You or your child have confusion or are very weak, or feel very ill.  Your child seems much more ill, will not wake up, will not respond right, or is crying for a long time and will not calm down.  You or your child are showing signs of dehydration. These signs may include decreased urination (pee), dry mouth/gums/tongue, or decreased activity.    Follow-up with your provider:   Children under 24 months need to be seen by their regular provider within one week after a diagnosis of a UTI. It may be necessary to do some more tests to look at the child s kidney or bladder.  You should begin to feel better within 24 - 48 hours of starting your antibiotic; follow-up with your regular clinic/doctor/provider if this is not the case.    Treatment:   You will be treated with an antibiotic to kill the bacteria. We have to make an educated guess, based on what we know about common bacteria and antibiotics, as to which antibiotic will work for your  "infection. We will be correct most times but there will be some cases where the antibiotic chosen is not correct (see urine cultures below).  Take a pain medication such as acetaminophen (Tylenol ) or ibuprofen (Advil , Motrin , Nuprin ).  Phenazopyridine (Pyridium , Uristat ) is a prescription medication that numbs the bladder to reduce the burning pain of some UTIs.  The same medication is available in a non-prescription version (Azo-Standard , Urodol ). This medication will change the color of the urine and tears (usually blue or orange). If you wear contacts, do not wear them while taking this medication as they may be stained by the medication.    Urine Cultures:  If indicated, a urine culture may have been performed today. This test generally takes 24-48 hours to complete so the results are not known at this time. The results can confirm that an infection is present but also determine which antibiotic is effective for the specific bacteria that is causing the infection. If your urine culture shows that the antibiotic you were given today will not work to treat your infection, we will attempt to contact you to make arrangements to change the antibiotic. If the culture confirms that the antibiotic is effective for your infection, you will not be contacted. We often recommend follow-up with your regular physician/provider on the culture results regardless of this process.    Antibiotic Warning:   If you have been placed on antibiotics - watch for signs of allergic reaction.  These include rash, lip swelling, difficulty breathing, wheezing, and dizziness.  If you develop any of these symptoms, stop the antibiotic immediately and go to an emergency room or urgent care for evaluation.    Probiotics: If you have been given an antibiotic, you may want to also take a probiotic pill or eat yogurt with live cultures. Probiotics have \"good bacteria\" to help your intestines stay healthy. Studies have shown that probiotics " help prevent diarrhea and other intestine problems (including C. diff infection) when you take antibiotics. You can buy these without a prescription in the pharmacy section of the store.   If you were given a prescription for medicine here today, be sure to read all of the information (including the package insert) that comes with your prescription.  This will include important information about the medicine, its side effects, and any warnings that you need to know about.  The pharmacist who fills the prescription can provide more information and answer questions you may have about the medicine.  If you have questions or concerns that the pharmacist cannot address, please call or return to the Emergency Department.   Remember that you can always come back to the Emergency Department if you are not able to see your regular provider in the amount of time listed above, if you get any new symptoms, or if there is anything that worries you.    Discharge Instructions  Upper Respiratory Infection    The upper respiratory tract includes the sinuses, nasal passages, pharynx, and larynx. A URI, or upper respiratory infection, is an infection of any of the parts of the upper airway. Symptoms include runny nose, congestion, sneezing, sore throat, cough, and fever. URIs are almost always caused by a virus. Antibiotics do not help with viral infections, so are generally not prescribed. A URI is very contagious through coughing and nasal secretions; make sure you wash your hands often and clean surfaces after sneezing, coughing or touching them. While you should start to improve in 3 - 5 days, remember that sometimes a cough can linger for several weeks.    Generally, every Emergency Department visit should have a follow-up clinic visit with either a primary or a specialty clinic/provider. Please follow-up as instructed by your emergency provider today.    Return to the Emergency Department if:  Any of your symptoms get much  worse.  You seem very sick, like being too weak to get up.  You have chest pain or shortness of breath.   You have a severe headache.  You are vomiting (throwing up) so much you cannot keep fluids or medicines down.  You have confusion or seem unusually drowsy.  You have a seizure.    What can I do to help myself?  Fill any prescriptions the provider gave you and take them right away  If you have a fever, get plenty of rest and drink lots of fluids, especially water.  Using a humidifier or saline nose spray will also help loosen mucous.   Clothes or blankets will not change your fever. Do what is comfortable for you.  Bathing or sponging in lukewarm water may help you feel better.  Acetaminophen (Tylenol ) or ibuprofen (Advil , Motrin ) will help bring fever down and may help you feel more comfortable. Be sure to read and follow the package directions, and ask your provider if you have questions.  Do not drink alcohol.  Decongestants may help you feel better. You may use decongestant nose sprays Afrin  (oxymetazoline) or Kedar-Synephrine  (phenylephrine hydrochloride) for up to 3 days, or may use a decongestant tablet like Sudafed  (pseudoephedrine).  If you were given a prescription for medicine here today, be sure to read all of the information (including the package insert) that comes with your prescription.  This will include important information about the medicine, its side effects, and any warnings that you need to know about.  The pharmacist who fills the prescription can provide more information and answer questions you may have about the medicine.  If you have questions or concerns that the pharmacist cannot address, please call or return to the Emergency Department.   Remember that you can always come back to the Emergency Department if you are not able to see your regular provider in the amount of time listed above, if you get any new symptoms, or if there is anything that worries you.

## 2024-10-01 LAB
ATRIAL RATE - MUSE: 81 BPM
DIASTOLIC BLOOD PRESSURE - MUSE: NORMAL MMHG
INTERPRETATION ECG - MUSE: NORMAL
P AXIS - MUSE: 34 DEGREES
PR INTERVAL - MUSE: 160 MS
QRS DURATION - MUSE: 82 MS
QT - MUSE: 386 MS
QTC - MUSE: 448 MS
R AXIS - MUSE: 18 DEGREES
SYSTOLIC BLOOD PRESSURE - MUSE: NORMAL MMHG
T AXIS - MUSE: 8 DEGREES
VENTRICULAR RATE- MUSE: 81 BPM

## 2024-10-02 ENCOUNTER — NURSE TRIAGE (OUTPATIENT)
Dept: NURSING | Facility: CLINIC | Age: 29
End: 2024-10-02
Payer: COMMERCIAL

## 2024-10-02 LAB
BACTERIA UR CULT: ABNORMAL
BACTERIA UR CULT: ABNORMAL

## 2024-10-02 NOTE — TELEPHONE ENCOUNTER
Patient was seen in the Emergency Room on 9/30/24 and an EKG was performed.  She states that she was reviewing MyChart and the results of that EKG and she has further questions on what the interpretation means.  Patient does not have health insurance for another month and she is not established with [facility].  Recommend that the patient call [facility] where she's seen Family Medicine and ask if they will go over the results with her or give her more information.  Patient verbalized understanding information.      Reason for Disposition   [1] Caller requesting NON-URGENT health information AND [2] PCP's office is the best resource    Additional Information   Negative: RN needs further essential information from caller in order to complete triage   Negative: Requesting regular office appointment    Protocols used: Information Only Call - No Triage-A-

## 2024-10-03 ENCOUNTER — TELEPHONE (OUTPATIENT)
Dept: NURSING | Facility: CLINIC | Age: 29
End: 2024-10-03
Payer: COMMERCIAL

## 2024-10-03 NOTE — TELEPHONE ENCOUNTER
[facility]    Reason for call: Lab Result Notification     Lab Result (including Rx patient on, if applicable).  If culture, copy of lab report at bottom.  Lab Result: Urine Culture - See Below    ED Rx: sulfamethoxazole-trimethoprim (BACTRIM DS) 800-160 MG tablet - Take 1 tablet by mouth 2 times daily for 7 days (SUSCEPTIBLE)    Creatinine Level (mg/dl)   Creatinine   Date Value Ref Range Status   01/04/2022 0.60 0.52 - 1.04 mg/dL Final   04/01/2016 0.41 (L) 0.52 - 1.04 mg/dL Final    Creatinine clearance (ml/min), if applicable    Creatinine clearance cannot be calculated (Patient's most recent lab result is older than the maximum 365 days allowed.)     ED Symptoms: Presented to the ED with ear pain, headache, dysuria, and urinary frequency.     Current Symptoms: Patient states she is feeling better.  Patient requesting to speak with someone regarding her EKG. Advised patient that she would need to speak with her PCP. Patient verbalized understanding.     RN Recommendations/Instructions per [place] ED lab result protocol:   [facility] ED lab result protocol utilized: Urine Culture    Patient/care giver notified to contact your PCP clinic or return to the Emergency department if your:  Symptoms do not resolve after completing antibiotic.  Symptoms worsen or other concerning symptoms.       BOOGIE BRIZUELA RN

## 2025-01-08 SDOH — HEALTH STABILITY: PHYSICAL HEALTH: ON AVERAGE, HOW MANY DAYS PER WEEK DO YOU ENGAGE IN MODERATE TO STRENUOUS EXERCISE (LIKE A BRISK WALK)?: 7 DAYS

## 2025-01-08 SDOH — HEALTH STABILITY: PHYSICAL HEALTH: ON AVERAGE, HOW MANY MINUTES DO YOU ENGAGE IN EXERCISE AT THIS LEVEL?: 10 MIN

## 2025-01-08 ASSESSMENT — SOCIAL DETERMINANTS OF HEALTH (SDOH): HOW OFTEN DO YOU GET TOGETHER WITH FRIENDS OR RELATIVES?: ONCE A WEEK

## 2025-01-08 NOTE — PROGRESS NOTES
Preventive Care Visit  [facility]  SHANANN Rushing CNP, Nurse Practitioner Primary Care  Jan 9, 2025      Assessment & Plan     Routine general medical examination at a health care facility  -Preventive health topics discussed including adequate exercise and healthy diet. Return to clinic in one year for preventative exam or sooner with any other concerns. Other issues discussed as noted below.     - Lipid Profile  - Lipid Profile    Family history of ischemic heart disease  - Lipoprotein (a)  - VENOUS COLLECTION  - Lipoprotein (a)  - Lipid Profile  - Lipid Profile    Screening for endocrine, metabolic and immunity disorder  - Comprehensive metabolic panel  - Comprehensive metabolic panel    Screening for cervical cancer    Intrauterine contraceptive device threads lost, initial encounter  - unable to see strings, curious if this cause for cramping, US ordered  - Red flags that warrant emergent evaluation discussed  - Patient verbalized understanding and is agreeable to the discussed plan of care.    - US Pelvic Complete with Transvaginal    RUQ abdominal pain  - could be relationship to semaglutide, recommend no longer taking this compounded and to follow up in a couple weeks, still symptomatic given recent dose 2 days ago - can trial metamucil and omeprazole to see if any relief     Nausea  - suspect r/t semaglutide or unsure where IUD strings are so will be sure there is not perforation with US today  - if worsening needs to go to ER       Patient has been advised of split billing requirements and indicates understanding: Yes        Nicotine/Tobacco Cessation  She reports that she has been smoking cigarettes. She has a 0.8 pack-year smoking history. She has never used smokeless tobacco.  Nicotine/Tobacco Cessation Plan  Information offered: Patient not interested at this time      BMI  Estimated body mass index is 31.6 kg/m  as calculated from the following:    Height as of this encounter: 1.638 m  "(5' 4.5\").    Weight as of this encounter: 84.8 kg (187 lb).   Weight management plan: would like to get on wegovy, I want patient to heal from taking compounding semaglutide given current symptoms and schedule weight management visit     Counseling  Appropriate preventive services were addressed with this patient via screening, questionnaire, or discussion as appropriate for fall prevention, nutrition, physical activity, Tobacco-use cessation, social engagement, weight loss and cognition.  Checklist reviewing preventive services available has been given to the patient.  Reviewed patient's diet, addressing concerns and/or questions.   The patient was instructed to see the dentist every 6 months.   She is at risk for psychosocial distress and has been provided with information to reduce risk.       See Patient Instructions    Return in about 53 weeks (around 1/15/2026) for Annual Wellness Visit.    Priya Abbott is a 29 year old, presenting for the following:  Physical (Fasting), Health Maintenance (Last CPX: 05/2019 @ [facility]/PAP: Due, last in 05/2018/Vaccines: Covid, Flu, PCV due), and Gastrointestinal Problem (Upper stomach pains, cramp-like. Felt better after throwing up /Sx onset: a couple weeks ago, off and on )           [facility]  [facility]  Final semester dental assistant   Vaping nicotine   2 children, 11 year old daughter, 5 year old son - gets help from grandparents, good supportive family     1.) finished semaglutide a couple days ago and noticed more nausea and cramping - emesis today and then 2 days ago.  The cramping has been intermittent for the past couple weeks - feels correlative with semaglutide that she gets from a random compounding pharmacy I have not heard of     2.) bipolar/anxiety/depression: managed by [facility] Psychiatrist and therapist - not taking clonidine any more - will just be started on risperodone      Health Care Directive  Patient does not have a Health Care " Directive: did not discuss       1/8/2025   General Health   How would you rate your overall physical health? (!) FAIR   Feel stress (tense, anxious, or unable to sleep) To some extent   (!) STRESS CONCERN      1/8/2025   Nutrition   Three or more servings of calcium each day? (!) I DON'T KNOW   Diet: Regular (no restrictions)   How many servings of fruit and vegetables per day? (!) 2-3   How many sweetened beverages each day? 0-1         1/8/2025   Exercise   Days per week of moderate/strenous exercise 7 days   Average minutes spent exercising at this level 10 min         1/8/2025   Social Factors   Frequency of gathering with friends or relatives Once a week   Worry food won't last until get money to buy more No   Food not last or not have enough money for food? No   Do you have housing? (Housing is defined as stable permanent housing and does not include staying ouside in a car, in a tent, in an abandoned building, in an overnight shelter, or couch-surfing.) Yes   Are you worried about losing your housing? No   Lack of transportation? No   Unable to get utilities (heat,electricity)? No         1/8/2025   Dental   Dentist two times every year? (!) NO         1/8/2025   TB Screening   Were you born outside of the [place]? No         Today's PHQ-2 Score:       1/8/2025     3:23 PM   PHQ-2 ( 1999 [employer])   Q1: Little interest or pleasure in doing things 1   Q2: Feeling down, depressed or hopeless 1   PHQ-2 Score 2    Q1: Little interest or pleasure in doing things Several days   Q2: Feeling down, depressed or hopeless Several days   PHQ-2 Score 2       Patient-reported           1/8/2025   Substance Use   Alcohol more than 3/day or more than 7/wk No   Do you use any other substances recreationally? (!) CANNABIS PRODUCTS     Social History     Tobacco Use    Smoking status: Some Days     Current packs/day: 0.25     Average packs/day: 0.3 packs/day for 3.0 years (0.8 ttl pk-yrs)     Types: Cigarettes    Smokeless tobacco:  "Never    Tobacco comments:     cut down to every other day now.    Substance Use Topics    Alcohol use: Not Currently     Alcohol/week: 2.5 standard drinks of alcohol     Types: 3 Standard drinks or equivalent per week     Comment: weekends not with pregnancy     Drug use: No             1/8/2025   Breast Cancer Screening   Family history of breast, colon, or ovarian cancer? No / Unknown      Mammogram Screening - Patient under 40 years of age: Routine Mammogram Screening not recommended.         1/8/2025   STI Screening   New sexual partner(s) since last STI/HIV test? (!) YES - no concerns for testing today      History of abnormal Pap smear: No - age 30- 63 PAP with HPV every 5 years recommended             1/8/2025   Contraception/Family Planning   Questions about contraception or family planning (!) YES IUD needs to be removed - patient thinks has had in for 7 years         Reviewed and updated as needed this visit by Provider    Allergies  Meds  Problems    Fam Hx            Past Medical History:   Diagnosis Date    Anemia     Bipolar affective disorder (H)     Smoker     irreg use     Lab work is in process      Review of Systems  Constitutional, neuro, ENT, endocrine, pulmonary, cardiac, gastrointestinal, genitourinary, musculoskeletal, integument and psychiatric systems are negative, except as otherwise noted.     Objective    Exam  /70   Pulse 75   Ht 1.638 m (5' 4.5\")   Wt 84.8 kg (187 lb)   SpO2 97%   BMI 31.60 kg/m     Estimated body mass index is 31.6 kg/m  as calculated from the following:    Height as of this encounter: 1.638 m (5' 4.5\").    Weight as of this encounter: 84.8 kg (187 lb).    Physical Exam  GENERAL: alert and no distress  EYES: Eyes grossly normal to inspection, PERRL and conjunctivae and sclerae normal  HENT: ear canals and TM's normal, nose and mouth without ulcers or lesions  NECK: no adenopathy, no asymmetry, masses, or scars  RESP: lungs clear to auscultation - no " rales, rhonchi or wheezes  CV: regular rate and rhythm, normal S1 S2, no S3 or S4, no murmur, click or rub, no peripheral edema  ABDOMEN: soft, nontender, no hepatosplenomegaly, no masses and bowel sounds normal   (female): normal female external genitalia, normal urethral meatus , normal vaginal mucosa, vaginal mucosa pink, moist, well rugated, normal cervix, adnexae, and uterus without masses., and unable to visualize IUD strings   RECTAL: normal sphincter tone, no rectal masses  MS: no gross musculoskeletal defects noted, no edema  SKIN: no suspicious lesions or rashes  NEURO: Normal strength and tone, mentation intact and speech normal  PSYCH: mentation appears normal, affect normal/bright        Signed Electronically by: SHANNAN Rushing CNP

## 2025-01-09 ENCOUNTER — OFFICE VISIT (OUTPATIENT)
Dept: FAMILY MEDICINE | Facility: CLINIC | Age: 30
End: 2025-01-09

## 2025-01-09 VITALS
BODY MASS INDEX: 31.16 KG/M2 | HEIGHT: 65 IN | SYSTOLIC BLOOD PRESSURE: 125 MMHG | HEART RATE: 75 BPM | OXYGEN SATURATION: 97 % | WEIGHT: 187 LBS | DIASTOLIC BLOOD PRESSURE: 70 MMHG

## 2025-01-09 DIAGNOSIS — T83.32XA INTRAUTERINE CONTRACEPTIVE DEVICE THREADS LOST, INITIAL ENCOUNTER: ICD-10-CM

## 2025-01-09 DIAGNOSIS — Z12.4 SCREENING FOR CERVICAL CANCER: ICD-10-CM

## 2025-01-09 DIAGNOSIS — Z82.49 FAMILY HISTORY OF ISCHEMIC HEART DISEASE: ICD-10-CM

## 2025-01-09 DIAGNOSIS — Z00.00 ROUTINE GENERAL MEDICAL EXAMINATION AT A HEALTH CARE FACILITY: Primary | ICD-10-CM

## 2025-01-09 DIAGNOSIS — R11.0 NAUSEA: ICD-10-CM

## 2025-01-09 DIAGNOSIS — Z13.29 SCREENING FOR ENDOCRINE, METABOLIC AND IMMUNITY DISORDER: ICD-10-CM

## 2025-01-09 DIAGNOSIS — Z13.228 SCREENING FOR ENDOCRINE, METABOLIC AND IMMUNITY DISORDER: ICD-10-CM

## 2025-01-09 DIAGNOSIS — Z13.0 SCREENING FOR ENDOCRINE, METABOLIC AND IMMUNITY DISORDER: ICD-10-CM

## 2025-01-09 DIAGNOSIS — R10.11 RUQ ABDOMINAL PAIN: ICD-10-CM

## 2025-01-09 LAB
ALBUMIN SERPL BCG-MCNC: 4.4 G/DL (ref 3.5–5.2)
ALP SERPL-CCNC: 66 U/L (ref 40–150)
ALT SERPL W P-5'-P-CCNC: 13 U/L (ref 0–50)
ANION GAP SERPL CALCULATED.3IONS-SCNC: 7 MMOL/L (ref 7–15)
AST SERPL W P-5'-P-CCNC: 17 U/L (ref 0–45)
BILIRUB SERPL-MCNC: 0.4 MG/DL
BUN SERPL-MCNC: 5.9 MG/DL (ref 6–20)
CALCIUM SERPL-MCNC: 9.4 MG/DL (ref 8.8–10.4)
CHLORIDE SERPL-SCNC: 106 MMOL/L (ref 98–107)
CHOLEST SERPL-MCNC: 175 MG/DL
CREAT SERPL-MCNC: 0.56 MG/DL (ref 0.51–0.95)
EGFRCR SERPLBLD CKD-EPI 2021: >90 ML/MIN/1.73M2
FASTING STATUS PATIENT QL REPORTED: YES
FASTING STATUS PATIENT QL REPORTED: YES
GLUCOSE SERPL-MCNC: 83 MG/DL (ref 70–99)
HCO3 SERPL-SCNC: 25 MMOL/L (ref 22–29)
HDLC SERPL-MCNC: 38 MG/DL
LDLC SERPL CALC-MCNC: 126 MG/DL
NONHDLC SERPL-MCNC: 137 MG/DL
POTASSIUM SERPL-SCNC: 4.1 MMOL/L (ref 3.4–5.3)
PROT SERPL-MCNC: 7.6 G/DL (ref 6.4–8.3)
SODIUM SERPL-SCNC: 138 MMOL/L (ref 135–145)
TRIGL SERPL-MCNC: 54 MG/DL

## 2025-01-09 PROCEDURE — 36415 COLL VENOUS BLD VENIPUNCTURE: CPT

## 2025-01-09 RX ORDER — RISPERIDONE 0.5 MG/1
0.5 TABLET, ORALLY DISINTEGRATING ORAL 2 TIMES DAILY
COMMUNITY

## 2025-01-09 RX ORDER — CLONIDINE HYDROCHLORIDE 0.3 MG/1
0.3 TABLET ORAL AT BEDTIME
COMMUNITY
Start: 2024-10-04

## 2025-01-09 NOTE — PATIENT INSTRUCTIONS
Patient Education   Preventive Care Advice   This is general advice given by our system to help you stay healthy. However, your care team may have specific advice just for you. Please talk to your care team about your preventive care needs.  Nutrition  Eat 5 or more servings of fruits and vegetables each day.  Try wheat bread, brown rice and whole grain pasta (instead of white bread, rice, and pasta).  Get enough calcium and vitamin D. Check the label on foods and aim for 100% of the RDA (recommended daily allowance).  Lifestyle  Exercise at least 150 minutes each week  (30 minutes a day, 5 days a week).  Do muscle strengthening activities 2 days a week. These help control your weight and prevent disease.  No smoking.  Wear sunscreen to prevent skin cancer.  Have a dental exam and cleaning every 6 months.  Yearly exams  See your health care team every year to talk about:  Any changes in your health.  Any medicines your care team has prescribed.  Preventive care, family planning, and ways to prevent chronic diseases.  Shots (vaccines)   HPV shots (up to age 26), if you've never had them before.  Hepatitis B shots (up to age 59), if you've never had them before.  COVID-19 shot: Get this shot when it's due.  Flu shot: Get a flu shot every year.  Tetanus shot: Get a tetanus shot every 10 years.  Pneumococcal, hepatitis A, and RSV shots: Ask your care team if you need these based on your risk.  Shingles shot (for age 50 and up)  General health tests  Diabetes screening:  Starting at age 35, Get screened for diabetes at least every 3 years.  If you are younger than age 35, ask your care team if you should be screened for diabetes.  Cholesterol test: At age 39, start having a cholesterol test every 5 years, or more often if advised.  Bone density scan (DEXA): At age 50, ask your care team if you should have this scan for osteoporosis (brittle bones).  Hepatitis C: Get tested at least once in your life.  STIs (sexually  transmitted infections)  Before age 24: Ask your care team if you should be screened for STIs.  After age 24: Get screened for STIs if you're at risk. You are at risk for STIs (including HIV) if:  You are sexually active with more than one person.  You don't use condoms every time.  You or a partner was diagnosed with a sexually transmitted infection.  If you are at risk for HIV, ask about PrEP medicine to prevent HIV.  Get tested for HIV at least once in your life, whether you are at risk for HIV or not.  Cancer screening tests  Cervical cancer screening: If you have a cervix, begin getting regular cervical cancer screening tests starting at age 21.  Breast cancer scan (mammogram): If you've ever had breasts, begin having regular mammograms starting at age 40. This is a scan to check for breast cancer.  Colon cancer screening: It is important to start screening for colon cancer at age 45.  Have a colonoscopy test every 10 years (or more often if you're at risk) Or, ask your provider about stool tests like a FIT test every year or Cologuard test every 3 years.  To learn more about your testing options, visit:   .  For help making a decision, visit:   https://bit.ly/yw28851.  Prostate cancer screening test: If you have a prostate, ask your care team if a prostate cancer screening test (PSA) at age 55 is right for you.  Lung cancer screening: If you are a current or former smoker ages 50 to 80, ask your care team if ongoing lung cancer screenings are right for you.  For informational purposes only. Not to replace the advice of your health care provider. Copyright   2023 [facility]. All rights reserved. Clinically reviewed by the [facility]. [employer] [id number] - REV 01/24.  Learning About Stress  What is stress?     Stress is your body's response to a hard situation. Your body can have a physical, emotional, or mental response. Stress is a fact of life for most people, and it  affects everyone differently. What causes stress for you may not be stressful for someone else.  A lot of things can cause stress. You may feel stress when you go on a job interview, take a test, or run a race. This kind of short-term stress is normal and even useful. It can help you if you need to work hard or react quickly. For example, stress can help you finish an important job on time.  Long-term stress is caused by ongoing stressful situations or events. Examples of long-term stress include long-term health problems, ongoing problems at work, or conflicts in your family. Long-term stress can harm your health.  How does stress affect your health?  When you are stressed, your body responds as though you are in danger. It makes hormones that speed up your heart, make you breathe faster, and give you a burst of energy. This is called the fight-or-flight stress response. If the stress is over quickly, your body goes back to normal and no harm is done.  But if stress happens too often or lasts too long, it can have bad effects. Long-term stress can make you more likely to get sick, and it can make symptoms of some diseases worse. If you tense up when you are stressed, you may develop neck, shoulder, or low back pain. Stress is linked to high blood pressure and heart disease.  Stress also harms your emotional health. It can make you robb, tense, or depressed. Your relationships may suffer, and you may not do well at work or school.  What can you do to manage stress?  You can try these things to help manage stress:   Do something active. Exercise or activity can help reduce stress. Walking is a great way to get started. Even everyday activities such as housecleaning or yard work can help.  Try yoga or cadence chi. These techniques combine exercise and meditation. You may need some training at first to learn them.  Do something you enjoy. For example, listen to music or go to a movie. Practice your hobby or do volunteer  "work.  Meditate. This can help you relax, because you are not worrying about what happened before or what may happen in the future.  Do guided imagery. Imagine yourself in any setting that helps you feel calm. You can use online videos, books, or a teacher to guide you.  Do breathing exercises. For example:  From a standing position, bend forward from the waist with your knees slightly bent. Let your arms dangle close to the floor.  Breathe in slowly and deeply as you return to a standing position. Roll up slowly and lift your head last.  Hold your breath for just a few seconds in the standing position.  Breathe out slowly and bend forward from the waist.  Let your feelings out. Talk, laugh, cry, and express anger when you need to. Talking with supportive friends or family, a counselor, or a caleb leader about your feelings is a healthy way to relieve stress. Avoid discussing your feelings with people who make you feel worse.  Write. It may help to write about things that are bothering you. This helps you find out how much stress you feel and what is causing it. When you know this, you can find better ways to cope.  What can you do to prevent stress?  You might try some of these things to help prevent stress:  Manage your time. This helps you find time to do the things you want and need to do.  Get enough sleep. Your body recovers from the stresses of the day while you are sleeping.  Get support. Your family, friends, and community can make a difference in how you experience stress.  Limit your news feed. Avoid or limit time on social media or news that may make you feel stressed.  Do something active. Exercise or activity can help reduce stress. Walking is a great way to get started.  Where can you learn more?  Go to https://www.[employer].net/patiented  Enter N032 in the search box to learn more about \"Learning About Stress.\"  Current as of: October 24, 2023  Content Version: 14.3    2024 [employer]. "   Care instructions adapted under license by your healthcare professional. If you have questions about a medical condition or this instruction, always ask your healthcare professional. [employer] disclaims any warranty or liability for your use of this information.    Substance Use Disorder: Care Instructions  Overview     You can improve your life and health by stopping your use of alcohol or drugs. When you don't drink or use drugs, you may feel and sleep better. You may get along better with your family, friends, and coworkers. There are medicines and programs that can help with substance use disorder.  How can you care for yourself at home?  Here are some ways to help you stay sober and prevent relapse.  If you have been given medicine to help keep you sober or reduce your cravings, be sure to take it exactly as prescribed.  Talk to your doctor about programs that can help you stop using drugs or drinking alcohol.  Do not keep alcohol or drugs in your home.  Plan ahead. Think about what you'll say if other people ask you to drink or use drugs. Try not to spend time with people who drink or use drugs.  Use the time and money spent on drinking or drugs to do something that's important to you.  Preventing a relapse  Have a plan to deal with relapse. Learn to recognize changes in your thinking that lead you to drink or use drugs. Get help before you start to drink or use drugs again.  Try to stay away from situations, friends, or places that may lead you to drink or use drugs.  If you feel the need to drink alcohol or use drugs again, seek help right away. Call a trusted friend or family member. Some people get support from organizations such as Narcotics Anonymous or [employer] or from treatment facilities.  If you relapse, get help as soon as you can. Some people make a plan with another person that outlines what they want that person to do for them if they relapse. The plan usually includes how to  handle the relapse and who to notify in case of relapse.  Don't give up. Remember that a relapse doesn't mean that you have failed. Use the experience to learn the triggers that lead you to drink or use drugs. Then quit again. Recovery is a lifelong process. Many people have several relapses before they are able to quit for good.  Follow-up care is a key part of your treatment and safety. Be sure to make and go to all appointments, and call your doctor if you are having problems. It's also a good idea to know your test results and keep a list of the medicines you take.  When should you call for help?   Call 911  anytime you think you may need emergency care. For example, call if you or someone else:    Has overdosed or has withdrawal signs. Be sure to tell the emergency workers that you are or someone else is using or trying to quit using drugs. Overdose or withdrawal signs may include:  Losing consciousness.  Seizure.  Seeing or hearing things that aren't there (hallucinations).     Is thinking or talking about suicide or harming others.   Where to get help 24 hours a day, 7 days a week   If you or someone you know talks about suicide, self-harm, a mental health crisis, a substance use crisis, or any other kind of emotional distress, get help right away. You can:    Call the Suicide and Crisis Lifeline at 844.     Call 5-577-317-CRGW (1-291.339.2681).     Text HOME to 282092 to access the Crisis Text Line.   Consider saving these numbers in your phone.  Go to [employer]org for more information or to chat online.  Call your doctor now or seek immediate medical care if:    You are having withdrawal symptoms. These may include nausea or vomiting, sweating, shakiness, and anxiety.   Watch closely for changes in your health, and be sure to contact your doctor if:    You have a relapse.     You need more help or support to stop.   Where can you learn more?  Go to https://www.healthwise.net/patiented  Enter H573 in the  "search box to learn more about \"Substance Use Disorder: Care Instructions.\"  Current as of: November 15, 2023  Content Version: 14.3    2024 [employer].   Care instructions adapted under license by your healthcare professional. If you have questions about a medical condition or this instruction, always ask your healthcare professional. [employer] disclaims any warranty or liability for your use of this information.    Safer Sex: Care Instructions  Overview  Safer sex is a way to reduce your risk of getting a sexually transmitted infection (STI). It can also help prevent pregnancy.  Several products can help you practice safer sex and reduce your chance of STIs. One of the best is a condom. There are internal and external condoms. You can use a special rubber sheet (dental dam) for protection during oral sex. Disposable gloves can keep your hands from touching blood, semen, or other body fluids that can carry infections.  Remember that birth control methods such as diaphragms, IUDs, foams, and birth control pills do not stop you from getting STIs.  Follow-up care is a key part of your treatment and safety. Be sure to make and go to all appointments, and call your doctor if you are having problems. It's also a good idea to know your test results and keep a list of the medicines you take.  How can you care for yourself at home?  Think about getting vaccinated to help prevent hepatitis A, hepatitis B, and human papillomavirus (HPV). They can be spread through sex.  Use a condom every time you have sex. Use an external condom, which goes on the penis. Or use an internal condom, which goes into the vagina or anus.  Make sure you use the right size external condom. A condom that's too small can break easily. A condom that's too big can slip off during sex.  Use a new condom each time you have sex. Be careful not to poke a hole in the condom when you open the wrapper.  Don't use an internal condom " "and an external condom at the same time.  Never use petroleum jelly (such as Vaseline), grease, hand lotion, baby oil, or anything with oil in it. These products can make holes in the condom.  After intercourse, hold the edge of the condom as you remove it. This will help keep semen from spilling out of the condom.  Do not have sex with anyone who has symptoms of an STI, such as sores on the genitals or mouth.  Do not drink a lot of alcohol or use drugs before sex.  Limit your sex partners. Sex with one partner who has sex only with you can reduce your risk of getting an STI.  Don't share sex toys. But if you do share them, use a condom and clean the sex toys between each use.  Talk to any partners before you have sex. Talk about what you feel comfortable with and whether you have any boundaries with sex. And find out if your partner or partners may be at risk for any STI. Keep in mind that a person may be able to spread an STI even if they do not have symptoms. You and any partners may want to get tested for STIs.  Where can you learn more?  Go to https://www.[employer].net/patiented  Enter B608 in the search box to learn more about \"Safer Sex: Care Instructions.\"  Current as of: April 30, 2024  Content Version: 14.3    2024 [employer].   Care instructions adapted under license by your healthcare professional. If you have questions about a medical condition or this instruction, always ask your healthcare professional. [employer] disclaims any warranty or liability for your use of this information.       "

## 2025-01-10 LAB — APO A-I SERPL-MCNC: 90 MG/DL

## 2025-03-06 ENCOUNTER — OFFICE VISIT (OUTPATIENT)
Dept: URGENT CARE | Facility: URGENT CARE | Age: 30
End: 2025-03-06
Payer: COMMERCIAL

## 2025-03-06 VITALS
WEIGHT: 171.9 LBS | HEART RATE: 85 BPM | RESPIRATION RATE: 16 BRPM | BODY MASS INDEX: 29.05 KG/M2 | OXYGEN SATURATION: 99 % | TEMPERATURE: 98.8 F | DIASTOLIC BLOOD PRESSURE: 65 MMHG | SYSTOLIC BLOOD PRESSURE: 110 MMHG

## 2025-03-06 DIAGNOSIS — K52.9 GASTROENTERITIS: Primary | ICD-10-CM

## 2025-03-06 LAB
ALBUMIN SERPL BCG-MCNC: 4.2 G/DL (ref 3.5–5.2)
ALP SERPL-CCNC: 53 U/L (ref 40–150)
ALT SERPL W P-5'-P-CCNC: 9 U/L (ref 0–50)
ANION GAP SERPL CALCULATED.3IONS-SCNC: 7 MMOL/L (ref 7–15)
AST SERPL W P-5'-P-CCNC: 15 U/L (ref 0–45)
BASOPHILS # BLD AUTO: 0 10E3/UL (ref 0–0.2)
BASOPHILS NFR BLD AUTO: 0 %
BILIRUB SERPL-MCNC: 0.3 MG/DL
BUN SERPL-MCNC: 5.5 MG/DL (ref 6–20)
CALCIUM SERPL-MCNC: 9.4 MG/DL (ref 8.8–10.4)
CHLORIDE SERPL-SCNC: 109 MMOL/L (ref 98–107)
CREAT SERPL-MCNC: 0.57 MG/DL (ref 0.51–0.95)
EGFRCR SERPLBLD CKD-EPI 2021: >90 ML/MIN/1.73M2
EOSINOPHIL # BLD AUTO: 0.1 10E3/UL (ref 0–0.7)
EOSINOPHIL NFR BLD AUTO: 1 %
ERYTHROCYTE [DISTWIDTH] IN BLOOD BY AUTOMATED COUNT: 12.4 % (ref 10–15)
GLUCOSE SERPL-MCNC: 102 MG/DL (ref 70–99)
HCO3 SERPL-SCNC: 24 MMOL/L (ref 22–29)
HCT VFR BLD AUTO: 43.7 % (ref 35–47)
HGB BLD-MCNC: 14.4 G/DL (ref 11.7–15.7)
IMM GRANULOCYTES # BLD: 0 10E3/UL
IMM GRANULOCYTES NFR BLD: 0 %
LYMPHOCYTES # BLD AUTO: 1.5 10E3/UL (ref 0.8–5.3)
LYMPHOCYTES NFR BLD AUTO: 17 %
MCH RBC QN AUTO: 31.2 PG (ref 26.5–33)
MCHC RBC AUTO-ENTMCNC: 33 G/DL (ref 31.5–36.5)
MCV RBC AUTO: 95 FL (ref 78–100)
MONOCYTES # BLD AUTO: 0.5 10E3/UL (ref 0–1.3)
MONOCYTES NFR BLD AUTO: 5 %
NEUTROPHILS # BLD AUTO: 6.7 10E3/UL (ref 1.6–8.3)
NEUTROPHILS NFR BLD AUTO: 76 %
PLATELET # BLD AUTO: 213 10E3/UL (ref 150–450)
POTASSIUM SERPL-SCNC: 4.6 MMOL/L (ref 3.4–5.3)
PROT SERPL-MCNC: 7 G/DL (ref 6.4–8.3)
RBC # BLD AUTO: 4.62 10E6/UL (ref 3.8–5.2)
SODIUM SERPL-SCNC: 140 MMOL/L (ref 135–145)
WBC # BLD AUTO: 8.8 10E3/UL (ref 4–11)

## 2025-03-06 RX ORDER — ONDANSETRON 4 MG/1
4 TABLET, ORALLY DISINTEGRATING ORAL ONCE
Status: COMPLETED | OUTPATIENT
Start: 2025-03-06 | End: 2025-03-06

## 2025-03-06 RX ORDER — ONDANSETRON 4 MG/1
4 TABLET, ORALLY DISINTEGRATING ORAL EVERY 8 HOURS PRN
Qty: 30 TABLET | Refills: 0 | Status: SHIPPED | OUTPATIENT
Start: 2025-03-06

## 2025-03-06 RX ADMIN — ONDANSETRON 4 MG: 4 TABLET, ORALLY DISINTEGRATING ORAL at 10:29

## 2025-03-06 ASSESSMENT — ENCOUNTER SYMPTOMS
FEVER: 0
DYSURIA: 0
DIARRHEA: 1
NAUSEA: 1
VOMITING: 1
ABDOMINAL PAIN: 1

## 2025-03-06 NOTE — PROGRESS NOTES
SUBJECTIVE:   Milnea Brumfield is a 29 year old female presenting with a chief complaint of   Chief Complaint   Patient presents with    Urgent Care    Nausea, Vomiting, & Diarrhea     Pt is on compounded 60 units dose of semaglutide  presents to clinic with nausea, vomiting and diarrhea onset Tuesday, worse today with abdominal cramping and chest hurts   Unable to keep drink or food down - did not take anything for symptoms        She is a new patient of [facility].  Patient presents with vomiting and diarrhea with abdominal cramping that started last night.  Diarrhea about 5 times, vomiting about 3 days.  Vomiting food.  Last semaglutide was 2/25.  Last void 1 hour ago.  Nacho Quezada's yesterday.  No fevers.  No recent abx.  No recent travel.  No hx of c.diff.  Denies pregnancy.    Treatment:  ibuprofen    Review of Systems   Constitutional:  Negative for fever.   Gastrointestinal:  Positive for abdominal pain, diarrhea, nausea and vomiting.   Genitourinary:  Negative for dysuria.   All other systems reviewed and are negative.      Past Medical History:   Diagnosis Date    Anemia     Bipolar affective disorder (H)     Smoker     irreg use     Family History   Problem Relation Age of Onset    Psychotic Disorder Mother         depression    Hypertension Maternal Grandmother     Hypertension Maternal Grandfather     Diabetes Maternal Grandfather     Atrial fibrillation Maternal Grandfather      Current Outpatient Medications   Medication Sig Dispense Refill    cloNIDine (CATAPRES) 0.3 MG tablet Take 0.3 mg by mouth at bedtime.      EPINEPHrine (ANY BX GENERIC EQUIV) 0.3 MG/0.3ML injection 2-pack Inject 0.3 mLs (0.3 mg) into the muscle once as needed 2 each 3    ondansetron (ZOFRAN ODT) 4 MG ODT tab Take 1 tablet (4 mg) by mouth every 8 hours as needed for nausea. 30 tablet 0    risperiDONE (RISPERDAL M-TABS) 0.5 MG ODT Take 0.5 mg by mouth 2 times daily. Via [employer]      UNABLE TO FIND MEDICATION NAME: COMPOUND SEMAGLUTIDE  60 UNIT ONCE EVERY 7 DAYS INJECTION       Social History     Tobacco Use    Smoking status: Former     Current packs/day: 0.25     Average packs/day: 0.3 packs/day for 3.0 years (0.8 ttl pk-yrs)     Types: Cigarettes    Smokeless tobacco: Never    Tobacco comments:     cut down to every other day now.    Substance Use Topics    Alcohol use: Not Currently     Alcohol/week: 2.5 standard drinks of alcohol     Types: 3 Standard drinks or equivalent per week     Comment: weekends not with pregnancy        OBJECTIVE  /65 (BP Location: Left arm, Patient Position: Sitting, Cuff Size: Adult Regular)   Pulse 85   Temp 98.8  F (37.1  C) (Tympanic)   Resp 16   Wt 78 kg (171 lb 14.4 oz)   SpO2 99%   Breastfeeding No   BMI 29.05 kg/m      Physical Exam  Vitals and nursing note reviewed.   Constitutional:       Appearance: Normal appearance. She is normal weight.   Eyes:      Extraocular Movements: Extraocular movements intact.      Conjunctiva/sclera: Conjunctivae normal.   Cardiovascular:      Rate and Rhythm: Normal rate and regular rhythm.      Pulses: Normal pulses.      Heart sounds: Normal heart sounds.   Pulmonary:      Effort: Pulmonary effort is normal.      Breath sounds: Normal breath sounds.   Abdominal:      General: Abdomen is flat. Bowel sounds are normal.      Palpations: Abdomen is soft.      Tenderness: There is abdominal tenderness. There is no right CVA tenderness or left CVA tenderness.      Comments: Diffuse tenderness   Skin:     General: Skin is warm and dry.      Comments: No tenting   Neurological:      General: No focal deficit present.      Mental Status: She is alert.   Psychiatric:         Mood and Affect: Mood normal.         Behavior: Behavior normal.         Labs:  Results for orders placed or performed in visit on 03/06/25 (from the past 24 hours)   CBC with platelets and differential    Narrative    The following orders were created for panel order CBC with platelets and  differential.  Procedure                               Abnormality         Status                     ---------                               -----------         ------                     CBC with platelets and d...[[id number]]                                                   Please view results for these tests on the individual orders.       ASSESSMENT:      ICD-10-CM    1. Gastroenteritis  K52.9 CBC with platelets and differential     Comprehensive metabolic panel (BMP + Alb, Alk Phos, ALT, AST, Total. Bili, TP)     Enteric Bacteria and Virus Panel by [facility] Stool     ondansetron (ZOFRAN ODT) ODT tab 4 mg     ondansetron (ZOFRAN ODT) 4 MG ODT tab     CBC with platelets and differential     Comprehensive metabolic panel (BMP + Alb, Alk Phos, ALT, AST, Total. Bili, TP)     Enteric Bacteria and Virus Panel by [facility] Stool           Medical Decision Making:    Differential Diagnosis:  Gastro: viral gastroenteritis and SE of semaglutide    Serious Comorbid Conditions:  Adult:   reviewed    PLAN:    BRAT diet.  Push fluids.  All labs pending.  Will call with any concerning results.  Discussed reasons to seek immediate medical attention.  Additionally if no improvement or worsening in one week, may follow up with PCP and/or UC.        Followup:    If not improving or if condition worsens, follow up with your Primary Care Provider, If not improving or if conditions worsens over the next 12-24 hours, go to the Emergency Department    There are no Patient Instructions on file for this visit.

## 2025-06-04 ENCOUNTER — OFFICE VISIT (OUTPATIENT)
Dept: FAMILY MEDICINE | Facility: CLINIC | Age: 30
End: 2025-06-04

## 2025-06-04 VITALS
DIASTOLIC BLOOD PRESSURE: 82 MMHG | BODY MASS INDEX: 27.55 KG/M2 | WEIGHT: 163 LBS | OXYGEN SATURATION: 96 % | SYSTOLIC BLOOD PRESSURE: 120 MMHG | HEART RATE: 95 BPM

## 2025-06-04 DIAGNOSIS — E78.2 MIXED HYPERLIPIDEMIA: ICD-10-CM

## 2025-06-04 DIAGNOSIS — E78.41 ELEVATED LIPOPROTEIN(A): ICD-10-CM

## 2025-06-04 DIAGNOSIS — K64.4 EXTERNAL HEMORRHOIDS: Primary | ICD-10-CM

## 2025-06-04 PROCEDURE — 3079F DIAST BP 80-89 MM HG: CPT

## 2025-06-04 PROCEDURE — 3074F SYST BP LT 130 MM HG: CPT

## 2025-06-04 PROCEDURE — 99213 OFFICE O/P EST LOW 20 MIN: CPT

## 2025-06-04 RX ORDER — BUPROPION HYDROCHLORIDE 150 MG/1
1 TABLET ORAL
COMMUNITY
Start: 2025-04-09

## 2025-06-04 RX ORDER — SERTRALINE HYDROCHLORIDE 100 MG/1
1.5 TABLET, FILM COATED ORAL
COMMUNITY
Start: 2025-04-09

## 2025-06-04 RX ORDER — ROSUVASTATIN CALCIUM 20 MG/1
20 TABLET, COATED ORAL DAILY
Qty: 90 TABLET | Refills: 0 | Status: SHIPPED | OUTPATIENT
Start: 2025-06-04

## 2025-06-04 NOTE — PROGRESS NOTES
Assessment & Plan     External hemorrhoids  - given increasing pain will send for possible banding at colorectal   - Red flags that warrant emergent evaluation discussed  -Patient verbalized understanding and is agreeable to the discussed plan of care.  Discussed hemorrhoids in detail, including pathophysiology, their cause by difficult elimination. conservative treatments starting with increased water and dietary fiber (friuts/vegetables/bran/psyllium), conservative treatments such as Preparation H, Anusol, and even topical benzocaine oint.  Discussed point of intervention and referral to colorectal surgeons as indicated by symptoms.  - Adult Colorectal Surgery  Referral - To [facility] Location    Elevated lipoprotein(a)  - Education about adverse effects of prescription medication discussed  -Red flags that warrant emergent evaluation discussed  -Patient verbalized understanding and is agreeable to the discussed plan of care.    - recheck cholesterol in 3-6 months   - rosuvastatin (CRESTOR) 20 MG tablet  Dispense: 90 tablet; Refill: 0    Mixed hyperlipidemia  -see above   - rosuvastatin (CRESTOR) 20 MG tablet  Dispense: 90 tablet; Refill: 0              Follow-up  No follow-ups on file.    Priya Abbott is a 29 year old, presenting for the following health issues:  Lipids (Rechekck lipids, high cholesterol in the past) and Gastrointestinal Problem (Recheck hemorrhoid, worsening/)    History of Present Illness       Reason for visit:  Hemmroid and fissure. Also cholosterol check  Symptom onset:  3-4 weeks ago  Symptoms include:  Pain  Symptom intensity:  Severe  Symptom progression:  Staying the same  Had these symptoms before:  No  What makes it worse:  Using the bathroom  What makes it better:  Hot baths   She is taking medications regularly.        1.) lipid: eating less sugar, stopped semaglutide    2.) hemorrhoid: scant blood.  TUCKs pads, hydrocortisone cream without improvement.  Sitz baths feel best           Review of Systems  Constitutional, HEENT, cardiovascular, pulmonary, gi and gu systems are negative, except as otherwise noted.      Objective    /82   Pulse 95   Wt 73.9 kg (163 lb)   SpO2 96%   BMI 27.55 kg/m    Body mass index is 27.55 kg/m .  Physical Exam   GENERAL: alert and no distress  RESP: unlabored breathing, equal chest rise   RECTAL (female): hemorrhoid x2  SKIN: no suspicious lesions or rashes  PSYCH: mentation appears normal, affect normal/bright    No results found for this or any previous visit (from the past 24 hours).        Signed Electronically by: SHANNAN Rushing CNP

## 2025-06-05 ENCOUNTER — PATIENT OUTREACH (OUTPATIENT)
Dept: CARE COORDINATION | Facility: CLINIC | Age: 30
End: 2025-06-05
Payer: COMMERCIAL

## 2025-06-05 ENCOUNTER — TELEPHONE (OUTPATIENT)
Dept: SURGERY | Facility: CLINIC | Age: 30
End: 2025-06-05
Payer: COMMERCIAL

## 2025-06-05 ENCOUNTER — TRANSFERRED RECORDS (OUTPATIENT)
Dept: FAMILY MEDICINE | Facility: CLINIC | Age: 30
End: 2025-06-05

## 2025-06-05 NOTE — TELEPHONE ENCOUNTER
"Spoke with Milena regarding hemorrhoid pain and referral to colorectal from pcp yesterday. She states she has been dealing with hemorrhoids for months and she states she is in \"agony\" with pain most of the day. She was referred for possible banding. She said hot baths are the only thing that helps. She is also concerned she has a fissure too. Scant bleeding. Topical treatments not helping. Added to clinic next week with Melonie. Suggested trying daily Citrucel powder and miralax as needed to keep stools soft, continue sitz baths throughout the day, and can take tylenol/ibuprofen for pain relief. No other questions at this time.   "

## 2025-06-05 NOTE — TELEPHONE ENCOUNTER
[facility] Call Center    Phone Message    May a detailed message be left on voicemail: yes     Reason for Call: Other: Milena is calling in looking to schedule an appt for hemorrhoid banding. HP TE being sent per guidelines as Pt reports severe pain in the past 72 hours. Please call back as soon as possible to discuss     Action Taken: Message routed to:  [facility] ([facility]): SHANNA    Travel Screening: Not Applicable     Date of Service:

## 2025-06-12 ENCOUNTER — PATIENT OUTREACH (OUTPATIENT)
Dept: CARE COORDINATION | Facility: CLINIC | Age: 30
End: 2025-06-12

## 2025-06-23 ENCOUNTER — OFFICE VISIT (OUTPATIENT)
Dept: FAMILY MEDICINE | Facility: CLINIC | Age: 30
End: 2025-06-23

## 2025-06-23 VITALS
HEART RATE: 58 BPM | BODY MASS INDEX: 26.7 KG/M2 | SYSTOLIC BLOOD PRESSURE: 109 MMHG | WEIGHT: 158 LBS | OXYGEN SATURATION: 99 % | DIASTOLIC BLOOD PRESSURE: 66 MMHG

## 2025-06-23 DIAGNOSIS — K60.2 ANAL FISSURE: Primary | ICD-10-CM

## 2025-06-23 PROCEDURE — 99213 OFFICE O/P EST LOW 20 MIN: CPT

## 2025-06-23 PROCEDURE — 3078F DIAST BP <80 MM HG: CPT

## 2025-06-23 PROCEDURE — 3074F SYST BP LT 130 MM HG: CPT

## 2025-06-23 RX ORDER — NITROGLYCERIN 4 MG/G
1 OINTMENT RECTAL EVERY 12 HOURS
Qty: 30 G | Refills: 0 | Status: SHIPPED | OUTPATIENT
Start: 2025-06-23 | End: 2025-06-23

## 2025-06-23 RX ORDER — AMOXICILLIN 250 MG
1 CAPSULE ORAL DAILY
COMMUNITY
Start: 2025-06-23

## 2025-06-23 NOTE — LETTER
June 23, 2025      Milena Brumfield  [address]  [facility] [address]        To Whom It May Concern:    Milena Brumfield was seen in our clinic. She may return to school without restrictions if feeling improvement.      Sincerely,        SHANNAN Rushing CNP    Electronically signed

## 2025-06-23 NOTE — PROGRESS NOTES
Assessment & Plan     Anal fissure  - will see if nitroglycerin is covered, use instead of dilt, witch hazel, stool softener, increase fluids, metamucil - see AVS  - follow up with GI if not improving   - senna-docusate (SENOKOT-S/PERICOLACE) 8.6-50 MG tablet  - nitroGLYcerin 0.2% ointment  Dispense: 30 g; Refill: 0  - benzocaine (AMERICAINE) 20 % rectal ointment              Follow-up  No follow-ups on file.    Subjective   Milena is a 29 year old, presenting for the following health issues:  Gastrointestinal Problem (No improvement on fissure. Still having intense pain with Bms. Has been using diltiazem cream with no relief./Wondering about stool softener and topical lidocaine)    HPI      1.) anal fissure:  feels like not improving at all on diltiazem, had to call in to work today.  Shifting in her seat a lot.  Doing sitz baths. Wondering about alternatives. Very uncomfortable.          Review of Systems  Constitutional, HEENT, cardiovascular, pulmonary, gi and gu systems are negative, except as otherwise noted.      Objective    /66   Pulse 58   Wt 71.7 kg (158 lb)   SpO2 99%   BMI 26.70 kg/m    Body mass index is 26.7 kg/m .  Physical Exam   GENERAL: alert and no distress  RESP: unlabored breathing, equal chest rise   RECTAL (female): deferred  PSYCH: mentation appears normal, affect normal/bright    No results found for this or any previous visit (from the past 24 hours).        Signed Electronically by: SHANNAN Rushing CNP

## 2025-06-23 NOTE — PATIENT INSTRUCTIONS
- Metamucil powder daily    - Bulk Dietary Fiber to 30 grams/day  Increase fluid intake 64 ounces/day    - Stool softener - see med list     - witch hazel pads    - sitz baths

## 2025-06-24 DIAGNOSIS — K60.2 ANAL FISSURE: ICD-10-CM

## 2025-07-03 ENCOUNTER — OFFICE VISIT (OUTPATIENT)
Dept: FAMILY MEDICINE | Facility: CLINIC | Age: 30
End: 2025-07-03

## 2025-07-03 VITALS
DIASTOLIC BLOOD PRESSURE: 52 MMHG | HEART RATE: 65 BPM | WEIGHT: 163 LBS | SYSTOLIC BLOOD PRESSURE: 115 MMHG | BODY MASS INDEX: 27.55 KG/M2 | OXYGEN SATURATION: 99 %

## 2025-07-03 DIAGNOSIS — K64.4 EXTERNAL HEMORRHOIDS: ICD-10-CM

## 2025-07-03 DIAGNOSIS — K60.2 ANAL FISSURE: Primary | ICD-10-CM

## 2025-07-03 PROBLEM — Z36.89 ENCOUNTER FOR TRIAGE IN PREGNANT PATIENT: Status: RESOLVED | Noted: 2019-04-13 | Resolved: 2025-07-03

## 2025-07-03 PROBLEM — Z34.80 SUPERVISION OF OTHER NORMAL PREGNANCY, ANTEPARTUM: Status: RESOLVED | Noted: 2018-09-18 | Resolved: 2025-07-03

## 2025-07-03 NOTE — LETTER
July 3, 2025      Milena Brumfield  [address]  [facility] [address]        To Whom It May Concern:    Milena Brumfield  was seen on 7/3/2025.  Please excuse her until symptom improvement.        Sincerely,        SHANNAN Rushing CNP    Electronically signed

## 2025-07-03 NOTE — PROGRESS NOTES
"  Assessment & Plan     Anal fissure  - did not examine rectal vault due to pain.  Continue on fiber supplements, sounds like stool consistency is soft, BSC 4, using medications as prescribed, will send in nifedipine/lidocaine and if not improving curious if will need GI intervention like botox or procedural intervention  - continue supportive cares like witch hazel, sitz baths, petroleum jelly for comfort   - COMPOUNDED NON-CONTROLLED SUBSTANCE (CMPD RX) - PHARMACY TO MIX COMPOUNDED MEDICATION  Dispense: 30 g; Refill: 0    External hemorrhoids  - see plan above   - COMPOUNDED NON-CONTROLLED SUBSTANCE (CMPD RX) - PHARMACY TO MIX COMPOUNDED MEDICATION  Dispense: 30 g; Refill: 0            BMI  Estimated body mass index is 27.55 kg/m  as calculated from the following:    Height as of 1/9/25: 1.638 m (5' 4.5\").    Weight as of this encounter: 73.9 kg (163 lb).     Follow-up  No follow-ups on file.    Priya Abbott is a 29 year old, presenting for the following health issues:  Gastrointestinal Problem (Rectal pain was improving on new medications until yesterday. Now having pain again and feels that hemmroid may be infected. Would like it checked today) and A.D.H.D (Was unable to get into Nystroms. Wondering if we can switch her wellbutrin to vyvanse/adderall.)    HPI      1.) anal fissure: burning.  Doing metamucil and fiber and having soft stool.      Answers submitted by the patient for this visit:  General Questionnaire (Submitted on 6/23/2025)  Chief Complaint: Chronic problems general questions HPI Form  What is the reason for your visit today? : Fissure Pain  How many servings of fruits and vegetables do you eat daily?: 2-3  On average, how many sweetened beverages do you drink each day (Examples: soda, juice, sweet tea, etc.  Do NOT count diet or artificially sweetened beverages)?: 0  How many minutes a day do you exercise enough to make your heart beat faster?: 20 to 29  How many days a week do you " exercise enough to make your heart beat faster?: 5  How many days per week do you miss taking your medication?: 0  Questionnaire about: Chronic problems general questions HPI Form (Submitted on 6/23/2025)  Chief Complaint: Chronic problems general questions HPI Form    2.) medication concern:  unable to focus and having more racing thoughts.  Unsure if this is because she is having more pain everyday.        Review of Systems  Constitutional, HEENT, cardiovascular, pulmonary, gi and gu systems are negative, except as otherwise noted.      Objective    /52   Pulse 65   Wt 73.9 kg (163 lb)   SpO2 99%   BMI 27.55 kg/m    Body mass index is 27.55 kg/m .  Physical Exam   GENERAL: alert and no distress  RESP: unlabored breathing, equal chest rise   Anus (female): sphincter tone increase, and 2 hemorrhoids, probable fissue to superior part of anus  SKIN: no suspicious lesions or rashes  PSYCH: mentation appears normal, affect normal/bright    No results found for this or any previous visit (from the past 24 hours).        Signed Electronically by: SHANNAN Rushing CNP

## 2025-07-21 ENCOUNTER — MYC MEDICAL ADVICE (OUTPATIENT)
Dept: FAMILY MEDICINE | Facility: CLINIC | Age: 30
End: 2025-07-21

## 2025-08-04 ENCOUNTER — TRANSFERRED RECORDS (OUTPATIENT)
Dept: FAMILY MEDICINE | Facility: CLINIC | Age: 30
End: 2025-08-04